# Patient Record
Sex: MALE | Race: OTHER | HISPANIC OR LATINO | Employment: FULL TIME | URBAN - METROPOLITAN AREA
[De-identification: names, ages, dates, MRNs, and addresses within clinical notes are randomized per-mention and may not be internally consistent; named-entity substitution may affect disease eponyms.]

---

## 2020-06-14 ENCOUNTER — HOSPITAL ENCOUNTER (EMERGENCY)
Facility: HOSPITAL | Age: 55
Discharge: HOME | End: 2020-06-14
Attending: EMERGENCY MEDICINE | Admitting: EMERGENCY MEDICINE
Payer: COMMERCIAL

## 2020-06-14 VITALS
OXYGEN SATURATION: 96 % | DIASTOLIC BLOOD PRESSURE: 66 MMHG | TEMPERATURE: 97.7 F | WEIGHT: 283.95 LBS | SYSTOLIC BLOOD PRESSURE: 141 MMHG | RESPIRATION RATE: 18 BRPM | HEART RATE: 80 BPM

## 2020-06-14 DIAGNOSIS — R31.9 HEMATURIA: Primary | ICD-10-CM

## 2020-06-14 LAB
BACTERIA UR QL AUTO: ABNORMAL /HPF
BILIRUB UR QL STRIP: NEGATIVE
CLARITY UR: CLEAR
CLARITY, POC: CLEAR
COLOR UR: YELLOW
COLOR, POC: YELLOW
GLUCOSE UR STRIP-MCNC: NEGATIVE MG/DL
HGB UR QL STRIP.AUTO: NEGATIVE
KETONES UR STRIP-MCNC: NEGATIVE MG/DL
LEUKOCYTE ESTERASE UR QL STRIP: NEGATIVE
NITRITE UR QL STRIP: NEGATIVE
NON-SQ EPI CELLS URNS QL MICRO: ABNORMAL /HPF
PH UR STRIP.AUTO: 5.5 [PH] (ref 4.5–8)
PROT UR STRIP-MCNC: ABNORMAL MG/DL
RBC #/AREA URNS AUTO: ABNORMAL /HPF
SP GR UR STRIP.AUTO: 1.02 (ref 1–1.03)
UROBILINOGEN UR QL STRIP.AUTO: 0.2 E.U./DL
WBC #/AREA URNS AUTO: ABNORMAL /HPF

## 2020-06-14 PROCEDURE — 99283 EMERGENCY DEPT VISIT LOW MDM: CPT

## 2020-06-14 PROCEDURE — 99284 EMERGENCY DEPT VISIT MOD MDM: CPT | Performed by: EMERGENCY MEDICINE

## 2020-06-14 PROCEDURE — 81001 URINALYSIS AUTO W/SCOPE: CPT

## 2020-06-14 RX ORDER — HYDROCHLOROTHIAZIDE 12.5 MG/1
12.5 TABLET ORAL DAILY
COMMUNITY
End: 2022-05-09

## 2020-06-14 RX ORDER — LOSARTAN POTASSIUM 100 MG/1
100 TABLET ORAL DAILY
Status: ON HOLD | COMMUNITY

## 2020-06-14 RX ORDER — AMLODIPINE BESYLATE 10 MG/1
10 TABLET ORAL DAILY
Status: ON HOLD | COMMUNITY

## 2021-08-19 ENCOUNTER — TELEPHONE (OUTPATIENT)
Dept: NEPHROLOGY | Facility: CLINIC | Age: 56
End: 2021-08-19

## 2021-08-19 NOTE — TELEPHONE ENCOUNTER
Call and spoke with patient  Set him up for next available Neph Consult time (12/15/21 with Dr Altagracia Murdock)    No preferences on the wait list

## 2021-12-14 ENCOUNTER — TELEPHONE (OUTPATIENT)
Dept: NEPHROLOGY | Facility: CLINIC | Age: 56
End: 2021-12-14

## 2021-12-15 ENCOUNTER — TELEPHONE (OUTPATIENT)
Dept: NEPHROLOGY | Facility: CLINIC | Age: 56
End: 2021-12-15

## 2022-02-28 ENCOUNTER — OFFICE VISIT (OUTPATIENT)
Dept: UROLOGY | Facility: CLINIC | Age: 57
End: 2022-02-28
Payer: COMMERCIAL

## 2022-02-28 ENCOUNTER — TELEPHONE (OUTPATIENT)
Dept: UROLOGY | Facility: CLINIC | Age: 57
End: 2022-02-28

## 2022-02-28 VITALS
BODY MASS INDEX: 41.07 KG/M2 | OXYGEN SATURATION: 96 % | HEIGHT: 68 IN | SYSTOLIC BLOOD PRESSURE: 122 MMHG | WEIGHT: 271 LBS | DIASTOLIC BLOOD PRESSURE: 76 MMHG | HEART RATE: 76 BPM

## 2022-02-28 DIAGNOSIS — R31.0 GROSS HEMATURIA: ICD-10-CM

## 2022-02-28 DIAGNOSIS — R97.20 ELEVATED PSA: Primary | ICD-10-CM

## 2022-02-28 DIAGNOSIS — E11.65 UNCONTROLLED TYPE 2 DIABETES MELLITUS WITH HYPERGLYCEMIA (HCC): ICD-10-CM

## 2022-02-28 LAB
SL AMB  POCT GLUCOSE, UA: 1000
SL AMB LEUKOCYTE ESTERASE,UA: NORMAL
SL AMB POCT BILIRUBIN,UA: NORMAL
SL AMB POCT BLOOD,UA: NORMAL
SL AMB POCT CLARITY,UA: CLEAR
SL AMB POCT COLOR,UA: YELLOW
SL AMB POCT KETONES,UA: NORMAL
SL AMB POCT NITRITE,UA: NORMAL
SL AMB POCT PH,UA: 6
SL AMB POCT SPECIFIC GRAVITY,UA: 1.02
SL AMB POCT URINE PROTEIN: NORMAL
SL AMB POCT UROBILINOGEN: 0.2

## 2022-02-28 PROCEDURE — 88112 CYTOPATH CELL ENHANCE TECH: CPT | Performed by: SPECIALIST

## 2022-02-28 PROCEDURE — 81002 URINALYSIS NONAUTO W/O SCOPE: CPT | Performed by: PHYSICIAN ASSISTANT

## 2022-02-28 PROCEDURE — 99204 OFFICE O/P NEW MOD 45 MIN: CPT | Performed by: PHYSICIAN ASSISTANT

## 2022-02-28 RX ORDER — BLOOD SUGAR DIAGNOSTIC
STRIP MISCELLANEOUS
Status: ON HOLD | COMMUNITY
Start: 2022-01-25

## 2022-02-28 RX ORDER — ORAL SEMAGLUTIDE 7 MG/1
TABLET ORAL DAILY
Status: ON HOLD | COMMUNITY
Start: 2022-01-24

## 2022-02-28 RX ORDER — LANCETS 33 GAUGE
EACH MISCELLANEOUS
Status: ON HOLD | COMMUNITY
Start: 2022-01-25

## 2022-02-28 RX ORDER — FLUTICASONE PROPIONATE 50 MCG
SPRAY, SUSPENSION (ML) NASAL AS NEEDED
Status: ON HOLD | COMMUNITY
Start: 2022-02-12

## 2022-02-28 RX ORDER — LIDOCAINE 50 MG/G
PATCH TOPICAL AS NEEDED
Status: ON HOLD | COMMUNITY
Start: 2022-02-14

## 2022-02-28 RX ORDER — DAPAGLIFLOZIN 5 MG/1
TABLET, FILM COATED ORAL DAILY
Status: ON HOLD | COMMUNITY
Start: 2022-01-24

## 2022-02-28 RX ORDER — COLCHICINE 0.6 MG/1
CAPSULE ORAL DAILY
Status: ON HOLD | COMMUNITY
Start: 2022-02-12

## 2022-02-28 NOTE — PROGRESS NOTES
2/28/2022      Chief Complaint   Patient presents with    Elevated PSA         Assessment and Plan    56 y o  male -- New patient    1  Elevated PSA  - PSA (10/19/2021) 5 24, repeat PSA (02/11/2022) was 2 24  - GAIL today deferred  - discussed PSA and fluctuations that may occur  - call with any questions or concerns in the meantime  - All questions answered; patient understands and agrees with plan    2  Bilateral intrarenal calculi  - asymptomatic at this time, however he is interested in surgical intervention    3  Gross hematuria  - discussed workup for gross hematuria including urine cytology which was sent from office today, CT scan and office cystoscopy  - patient is agreeable and will return for office cystoscopy    4  Urinary frequency  - discussed conservative measures with adequate hydration, avoidance of bladder irritants, avoidance of constipation  - discussed addition of alpha blockade, however patient would like to hold off on medication at this time      History of Present Illness  Dayanna Magallon is a 56 y o  male new patient here for evaluation of elevated PSA  Patient had PSA performed October 2021 which was 5 24  Repeat PSA 2/11/22 was 2 24  Denies family history of  malignancies  Denies urinary complaints today  Denies gross hematuria, dysuria, flank pain, suprapubic pressure, fever, chills, nausea, vomiting  Patient did have a CT scan performed an outside facility and presented documentation with results  CT scan showing prostatic calcifications without any concerning findings  Patient states that he does have bilateral intrarenal calculi that are nonobstructing  Denies any symptoms associated with this  Denies having stone passage in the past   Patient is interested in surgical intervention as he would like to prevent obstruction in the future  Patient states that he has had an episode of gross hematuria few months ago    States that this is the 1 and only episode he has ever had  Denies any precipitating event  Denies tobacco use in the past, exposures to chemicals on a daily basis  Denies family history of  malignancies  Recent CT showing no evidence of uropathy  Patient states that he is having some urinary frequency especially at night  States that his daytime symptoms are pretty well managed conservatively  Has never been on medication for bladder or prostate in the past     Denies seeing urology in the past      Review of Systems   Constitutional: Negative for activity change, appetite change, chills and fever  HENT: Negative for congestion and trouble swallowing  Respiratory: Negative for cough and shortness of breath  Cardiovascular: Negative for chest pain, palpitations and leg swelling  Gastrointestinal: Negative for abdominal pain, constipation, diarrhea, nausea and vomiting  Genitourinary: Positive for frequency (Nighttime)  Negative for difficulty urinating, dysuria, flank pain, hematuria and urgency  Musculoskeletal: Negative for back pain and gait problem  Skin: Negative for wound  Allergic/Immunologic: Negative for immunocompromised state  Neurological: Negative for dizziness and syncope  Hematological: Does not bruise/bleed easily  Psychiatric/Behavioral: Negative for confusion  All other systems reviewed and are negative  Vitals  Vitals:    02/28/22 1339   BP: 122/76   Pulse: 76   SpO2: 96%   Weight: 123 kg (271 lb)   Height: 5' 8" (1 727 m)       Physical Exam  Constitutional:       General: He is not in acute distress  Appearance: Normal appearance  He is not ill-appearing, toxic-appearing or diaphoretic  HENT:      Head: Normocephalic  Nose: No congestion  Eyes:      General: No scleral icterus  Right eye: No discharge  Left eye: No discharge  Conjunctiva/sclera: Conjunctivae normal       Pupils: Pupils are equal, round, and reactive to light     Pulmonary:      Effort: Pulmonary effort is normal    Genitourinary:     Comments: GAIL deferred  Musculoskeletal:      Cervical back: Normal range of motion  Skin:     General: Skin is warm and dry  Coloration: Skin is not jaundiced or pale  Findings: No bruising, erythema, lesion or rash  Neurological:      General: No focal deficit present  Mental Status: He is alert and oriented to person, place, and time  Mental status is at baseline  Gait: Gait normal    Psychiatric:         Mood and Affect: Mood normal          Behavior: Behavior normal          Thought Content:  Thought content normal          Judgment: Judgment normal            Past History  Past Medical History:   Diagnosis Date    Elevated PSA     Hypertension     Renal disorder      Social History     Socioeconomic History    Marital status: Single     Spouse name: None    Number of children: None    Years of education: None    Highest education level: None   Occupational History    None   Tobacco Use    Smoking status: Never Smoker    Smokeless tobacco: Never Used   Vaping Use    Vaping Use: Never used   Substance and Sexual Activity    Alcohol use: Not Currently    Drug use: Not Currently    Sexual activity: None   Other Topics Concern    None   Social History Narrative    None     Social Determinants of Health     Financial Resource Strain: Not on file   Food Insecurity: Not on file   Transportation Needs: Not on file   Physical Activity: Not on file   Stress: Not on file   Social Connections: Not on file   Intimate Partner Violence: Not on file   Housing Stability: Not on file     Social History     Tobacco Use   Smoking Status Never Smoker   Smokeless Tobacco Never Used     Family History   Problem Relation Age of Onset    No Known Problems Father     Gallbladder disease Mother     Colon cancer Maternal Grandmother     Thyroid disease Sister     Asthma Sister     Gallbladder disease Sister         recent removal of gallbladder    No Known Problems Son        The following portions of the patient's history were reviewed and updated as appropriate: allergies, current medications, past medical history, past social history, past surgical history and problem list     Results  Recent Results (from the past 1 hour(s))   POCT urine dip    Collection Time: 02/28/22  1:48 PM   Result Value Ref Range    LEUKOCYTE ESTERASE,UA -     NITRITE,UA -     SL AMB POCT UROBILINOGEN 0 2     POCT URINE PROTEIN ++      PH,UA 6 0     BLOOD,UA -     SPECIFIC GRAVITY,UA 1 025     KETONES,UA -     BILIRUBIN,UA -     GLUCOSE, UA 1,000      COLOR,UA yellow     CLARITY,UA clear    ]  No results found for: PSA  No results found for: GLUCOSE, CALCIUM, NA, K, CO2, CL, BUN, CREATININE  No results found for: WBC, HGB, HCT, MCV, PLT    Alanna Tran PA-C

## 2022-02-28 NOTE — TELEPHONE ENCOUNTER
Called patient to verify appointment address location, and asked if patient would like to come in for 1:30 pm, he said he could do that

## 2022-03-07 ENCOUNTER — HOSPITAL ENCOUNTER (OUTPATIENT)
Dept: CT IMAGING | Facility: HOSPITAL | Age: 57
Discharge: HOME | End: 2022-03-07
Payer: COMMERCIAL

## 2022-03-07 DIAGNOSIS — R31.0 GROSS HEMATURIA: ICD-10-CM

## 2022-03-07 PROCEDURE — G1004 CDSM NDSC: HCPCS

## 2022-03-07 PROCEDURE — 74178 CT ABD&PLV WO CNTR FLWD CNTR: CPT

## 2022-03-07 RX ADMIN — IOHEXOL 100 ML: 350 INJECTION, SOLUTION INTRAVENOUS at 13:35

## 2022-05-07 NOTE — PROGRESS NOTES
Problem List Items Addressed This Visit        Endocrine    Uncontrolled type 2 diabetes mellitus with hyperglycemia ([facility] [address] )       Genitourinary    Renal cyst    Relevant Orders    Cytology, urine    Nephrolithiasis    Relevant Orders    Case request operating room: CYSTOSCOPY URETEROSCOPY WITH LITHOTRIPSY HOLMIUM LASER, RETROGRADE PYELOGRAM AND INSERTION STENT URETERAL (Completed)    Cytology, urine    Gross hematuria       Other    Elevated PSA    Abnormal urine cytology - Primary    Relevant Orders    Cytology, urine            Discussion:    Epi did well with his cystoscopy today for hematuria, his hematuria is likely due to his nonobstructing stone burden  His urethra looks normal, he has no lesions within the bladder  A repeat cytology has been sent  His abnormal cytology could be due to his use of for see go with associated inflammation or infection or due to the nonobstructing stones  He did have an elevated PSA which normalized  This will be followed  He has some renal cyst on the right-hand side which are benign, Bosniak 1, these do not require long-term surveillance  He is interested in stone removal as he has passed stones previously, we talked about ureteroscopy with laser lithotripsy and all indicated procedures and the pre, bassam, postop care for this procedure  He does understand the risks of surgery which include infection, bleeding, pain, damage to surrounding structures, need for additional procedures, risk of failure of the procedure, risk of anesthesia, risk of positioning complications including neurapraxia, chronic pain, and paralysis as well as risk of rhabdomyolysis and compartment syndrome  Risk of deep venous thrombosis and venous thromboembolism as well as pneumonia and other potential unpredictable complications were also discussed with the patient      He will return for surgery          Assessment and plan:     Please see problem oriented charting for the assessment plan of today's urological complaints      Melly Trinidad MD      Chief Complaint     As above      History of Present Illness     Quinn Chirinos is a 56 y o  man with elevated PSA as well as gross hematuria  He has completed his hematuria workup today which shows some prostatic enlargement and some nonobstructing stones on his upper tract imaging, no malignant findings are noted  I did offer him a trial of alpha blockade given some occasional sensations of incomplete bladder emptying, he does not feel that his bothers bad enough to warrant this  This is reasonable  He will return for ureteroscopic stone intervention  The following portions of the patient's history were reviewed and updated as appropriate: allergies, current medications, past family history, past medical history, past social history, past surgical history and problem list     Detailed Urologic History     - please refer to HPI    Review of Systems     Review of Systems   Constitutional: Negative  HENT: Negative  Eyes: Negative  Respiratory: Negative  Cardiovascular: Negative  Gastrointestinal: Negative  Endocrine: Negative  Genitourinary: Positive for hematuria  Mild LUTS   Musculoskeletal: Positive for arthralgias, back pain and gait problem  Skin: Negative  Allergic/Immunologic: Negative  Hematological: Negative  Psychiatric/Behavioral: Negative  Allergies     No Known Allergies    Physical Exam     Physical Exam  Vitals reviewed  Constitutional:       General: He is not in acute distress  Appearance: Normal appearance  He is obese  He is not ill-appearing, toxic-appearing or diaphoretic  HENT:      Head: Normocephalic and atraumatic  Eyes:      General: No scleral icterus  Right eye: No discharge  Left eye: No discharge  Cardiovascular:      Pulses: Normal pulses     Pulmonary:      Effort: Pulmonary effort is normal    Abdominal:      General: There is no distension  Palpations: There is no mass  Genitourinary:     Penis: Normal     Musculoskeletal:         General: No swelling  Skin:     General: Skin is warm  Neurological:      General: No focal deficit present  Mental Status: He is alert and oriented to person, place, and time  Cranial Nerves: No cranial nerve deficit  Psychiatric:         Mood and Affect: Mood normal          Behavior: Behavior normal          Thought Content:  Thought content normal          Judgment: Judgment normal              Vital Signs  Vitals:    05/09/22 1324   BP: 128/74   Pulse: 72   Resp: 18   SpO2: 98%   Weight: 123 kg (271 lb)   Height: 5' 8" (1 727 m)         Current Medications       Current Outpatient Medications:     amLODIPine (NORVASC) 10 mg tablet, Take 10 mg by mouth daily, Disp: , Rfl:     Colchicine 0 6 MG CAPS, daily, Disp: , Rfl:     Farxiga 5 MG TABS, daily, Disp: , Rfl:     fluticasone (FLONASE) 50 mcg/act nasal spray, as needed, Disp: , Rfl:     Lancets (OneTouch Delica Plus [id number]) MISC, , Disp: , Rfl:     lidocaine (LIDODERM) 5 %, as needed, Disp: , Rfl:     losartan (COZAAR) 100 MG tablet, Take 100 mg by mouth daily, Disp: , Rfl:     OneTouch Verio test strip, , Disp: , Rfl:     Rybelsus 7 MG TABS, daily, Disp: , Rfl:       Active Problems     Patient Active Problem List   Diagnosis    Uncontrolled type 2 diabetes mellitus with hyperglycemia (HCC)    Elevated PSA    Renal cyst    Nephrolithiasis    Abnormal urine cytology    Gross hematuria         Past Medical History     Past Medical History:   Diagnosis Date    Elevated PSA     Hypertension     Renal disorder          Surgical History     Past Surgical History:   Procedure Laterality Date    HIP SURGERY Left     osteonecrosis    KNEE CARTILAGE SURGERY Left     ROTATOR CUFF REPAIR Left          Family History     Family History   Problem Relation Age of Onset    No Known Problems Father     Gallbladder disease Mother     Colon cancer Maternal Grandmother     Thyroid disease Sister     Asthma Sister     Gallbladder disease Sister         recent removal of gallbladder    No Known Problems Son          Social History     Social History     Social History     Tobacco Use   Smoking Status Never Smoker   Smokeless Tobacco Never Used         Pertinent Lab Values     No results found for: CREATININE    PSA 2 24 from 2/11/22 (was 5 2 in 10/2021)    Final Diagnosis   A  Urine, Clean Catch:  Atypical urothelial cells (AUC)- see comment  Few groups of atypical urothelial cells with high nuclear to cytoplasmic ratio, hyperchromasia and irregular nuclear membranes in a background of benign urothelial cells  Benign squamous cells  Scattered neutrophils  Few red blood cells      Satisfactory for evaluation             Pertinent Imaging      Imaging reviewed, simple right-sided renal cysts are noted, lower pole nonobstructing stones are noted on the left

## 2022-05-07 NOTE — PATIENT INSTRUCTIONS

## 2022-05-09 ENCOUNTER — PROCEDURE VISIT (OUTPATIENT)
Dept: UROLOGY | Facility: CLINIC | Age: 57
End: 2022-05-09
Payer: COMMERCIAL

## 2022-05-09 VITALS
OXYGEN SATURATION: 98 % | BODY MASS INDEX: 41.07 KG/M2 | SYSTOLIC BLOOD PRESSURE: 128 MMHG | DIASTOLIC BLOOD PRESSURE: 74 MMHG | HEART RATE: 72 BPM | RESPIRATION RATE: 18 BRPM | HEIGHT: 68 IN | WEIGHT: 271 LBS

## 2022-05-09 DIAGNOSIS — N20.0 NEPHROLITHIASIS: ICD-10-CM

## 2022-05-09 DIAGNOSIS — E11.65 UNCONTROLLED TYPE 2 DIABETES MELLITUS WITH HYPERGLYCEMIA (HCC): ICD-10-CM

## 2022-05-09 DIAGNOSIS — R82.89 ABNORMAL URINE CYTOLOGY: Primary | ICD-10-CM

## 2022-05-09 DIAGNOSIS — R31.0 GROSS HEMATURIA: ICD-10-CM

## 2022-05-09 DIAGNOSIS — R97.20 ELEVATED PSA: ICD-10-CM

## 2022-05-09 DIAGNOSIS — N28.1 RENAL CYST: ICD-10-CM

## 2022-05-09 PROCEDURE — 88112 CYTOPATH CELL ENHANCE TECH: CPT | Performed by: PATHOLOGY

## 2022-05-09 PROCEDURE — 52000 CYSTOURETHROSCOPY: CPT | Performed by: UROLOGY

## 2022-05-09 PROCEDURE — 99214 OFFICE O/P EST MOD 30 MIN: CPT | Performed by: UROLOGY

## 2022-05-09 RX ORDER — GABAPENTIN 100 MG/1
300 CAPSULE ORAL ONCE
OUTPATIENT
Start: 2022-05-09 | End: 2022-05-09

## 2022-05-09 RX ORDER — ACETAMINOPHEN 325 MG/1
975 TABLET ORAL ONCE
OUTPATIENT
Start: 2022-05-09 | End: 2022-05-09

## 2022-05-09 NOTE — PROGRESS NOTES
Office Cystoscopy Procedure Note    Indication:    Hematuria    Informed consent   The risks, benefits, complications, treatment options, and expected outcomes were discussed with the patient  The patient concurred with the proposed plan and provided informed consent  Anesthesia  Lidocaine jelly 2%    Antibiotic prophylaxis   None    Procedure  The patient was placed in the supineposition, was prepped and draped in the usual manner using sterile technique, and 2% lidocaine jelly instilled into the urethra  A 17 F flexible cystoscope was then inserted into the urethra and the urethra and bladder carefully examined  The following findings were noted:    Findings:  Urethra:  Normal  Prostate:  Lateral lobe hypertrophy noted  Bladder:  No lesions tumors defects or stones, no carcinoma in-situ, negative  Ureteral orifices:  Orthotopic  Other findings:  None, retroflexed view confirms    Specimens: None                 Complications:    None; patient tolerated the procedure well           Disposition: To home     Condition: Stable    Plan: Negative cystoscopy, does show some enlargement of the prostate with some enlarged varices, however       Cystoscopy     Date/Time 5/9/2022 1:57 PM     Performed by  Melly Trinidad MD     Authorized by Melly Trinidad MD      Universal Protocol:  Consent: Verbal consent obtained  Written consent obtained  Risks and benefits: risks, benefits and alternatives were discussed  Consent given by: patient  Patient understanding: patient states understanding of the procedure being performed  Patient consent: the patient's understanding of the procedure matches consent given  Procedure consent: procedure consent matches procedure scheduled  Relevant documents: relevant documents present and verified  Test results: test results available and properly labeled  Site marked: the operative site was not marked  Radiology Images displayed and confirmed   If images not available, report reviewed: imaging studies available  Required items: required blood products, implants, devices, and special equipment available  Patient identity confirmed: verbally with patient and provided demographic data        Procedure Details:  Procedure type: cystoscopy    Patient tolerance: Patient tolerated the procedure well with no immediate complications    Additional Procedure Details: Office Cystoscopy Procedure Note    Indication:    Hematuria    Informed consent   The risks, benefits, complications, treatment options, and expected outcomes were discussed with the patient  The patient concurred with the proposed plan and provided informed consent  Anesthesia  Lidocaine jelly 2%    Antibiotic prophylaxis   None    Procedure  The patient was placed in the supineposition, was prepped and draped in the usual manner using sterile technique, and 2% lidocaine jelly instilled into the urethra  A 17 F flexible cystoscope was then inserted into the urethra and the urethra and bladder carefully examined    The following findings were noted:    Findings:  Urethra:  Normal  Prostate:  Lateral lobe hypertrophy noted  Bladder:  No lesions tumors defects or stones, no carcinoma in-situ, negative  Ureteral orifices:  Orthotopic  Other findings:  None, retroflexed view confirms    Specimens: None                 Complications:    None; patient tolerated the procedure well           Disposition: To home     Condition: Stable    Plan: Negative cystoscopy, does show some enlargement of the prostate with some enlarged varices, however

## 2022-05-09 NOTE — LETTER
May 9, 2022     Licha Vegas MD  [address] [facility] [address] [address]    Patient: Annabelle Ricks   YOB: 1965   Date of Visit: 5/9/2022       Dear Dr Nair Locus:    Thank you for referring Annabelle Ricks to me for evaluation  Below are my notes for this consultation  If you have questions, please do not hesitate to call me  I look forward to following your patient along with you  Sincerely,        Brennan Whalen MD        CC: No Recipients  Brennan Whalen MD  5/9/2022  1:58 PM  Sign when Signing Visit  Office Cystoscopy Procedure Note    Indication:    Hematuria    Informed consent   The risks, benefits, complications, treatment options, and expected outcomes were discussed with the patient  The patient concurred with the proposed plan and provided informed consent  Anesthesia  Lidocaine jelly 2%    Antibiotic prophylaxis   None    Procedure  The patient was placed in the supineposition, was prepped and draped in the usual manner using sterile technique, and 2% lidocaine jelly instilled into the urethra  A 17 F flexible cystoscope was then inserted into the urethra and the urethra and bladder carefully examined  The following findings were noted:    Findings:  Urethra:  Normal  Prostate:  Lateral lobe hypertrophy noted  Bladder:  No lesions tumors defects or stones, no carcinoma in-situ, negative  Ureteral orifices:  Orthotopic  Other findings:  None, retroflexed view confirms    Specimens: None                 Complications:    None; patient tolerated the procedure well           Disposition: To home     Condition: Stable    Plan: Negative cystoscopy, does show some enlargement of the prostate with some enlarged varices, however       Cystoscopy     Date/Time 5/9/2022 1:57 PM     Performed by  Brennan Whalen MD     Authorized by Brennan Whalen MD      Universal Protocol:  Consent: Verbal consent obtained  Written consent obtained    Risks and benefits: risks, benefits and alternatives were discussed  Consent given by: patient  Patient understanding: patient states understanding of the procedure being performed  Patient consent: the patient's understanding of the procedure matches consent given  Procedure consent: procedure consent matches procedure scheduled  Relevant documents: relevant documents present and verified  Test results: test results available and properly labeled  Site marked: the operative site was not marked  Radiology Images displayed and confirmed  If images not available, report reviewed: imaging studies available  Required items: required blood products, implants, devices, and special equipment available  Patient identity confirmed: verbally with patient and provided demographic data        Procedure Details:  Procedure type: cystoscopy    Patient tolerance: Patient tolerated the procedure well with no immediate complications    Additional Procedure Details: Office Cystoscopy Procedure Note    Indication:    Hematuria    Informed consent   The risks, benefits, complications, treatment options, and expected outcomes were discussed with the patient  The patient concurred with the proposed plan and provided informed consent  Anesthesia  Lidocaine jelly 2%    Antibiotic prophylaxis   None    Procedure  The patient was placed in the supineposition, was prepped and draped in the usual manner using sterile technique, and 2% lidocaine jelly instilled into the urethra  A 17 F flexible cystoscope was then inserted into the urethra and the urethra and bladder carefully examined    The following findings were noted:    Findings:  Urethra:  Normal  Prostate:  Lateral lobe hypertrophy noted  Bladder:  No lesions tumors defects or stones, no carcinoma in-situ, negative  Ureteral orifices:  Orthotopic  Other findings:  None, retroflexed view confirms    Specimens: None                 Complications:    None; patient tolerated the procedure well Disposition: To home     Condition: Stable    Plan: Negative cystoscopy, does show some enlargement of the prostate with some enlarged varices, however              Venkata Ramirez MD  5/9/2022  1:56 PM  Sign when Signing Visit       Problem List Items Addressed This Visit        Endocrine    Uncontrolled type 2 diabetes mellitus with hyperglycemia ([facility] [address] )       Genitourinary    Renal cyst    Relevant Orders    Cytology, urine    Nephrolithiasis    Relevant Orders    Case request operating room: CYSTOSCOPY URETEROSCOPY WITH LITHOTRIPSY HOLMIUM LASER, RETROGRADE PYELOGRAM AND INSERTION STENT URETERAL (Completed)    Cytology, urine    Gross hematuria       Other    Elevated PSA    Abnormal urine cytology - Primary    Relevant Orders    Cytology, urine            Discussion:    Epi did well with his cystoscopy today for hematuria, his hematuria is likely due to his nonobstructing stone burden  His urethra looks normal, he has no lesions within the bladder  A repeat cytology has been sent  His abnormal cytology could be due to his use of for see go with associated inflammation or infection or due to the nonobstructing stones  He did have an elevated PSA which normalized  This will be followed  He has some renal cyst on the right-hand side which are benign, Bosniak 1, these do not require long-term surveillance  He is interested in stone removal as he has passed stones previously, we talked about ureteroscopy with laser lithotripsy and all indicated procedures and the pre, bassam, postop care for this procedure  He does understand the risks of surgery which include infection, bleeding, pain, damage to surrounding structures, need for additional procedures, risk of failure of the procedure, risk of anesthesia, risk of positioning complications including neurapraxia, chronic pain, and paralysis as well as risk of rhabdomyolysis and compartment syndrome    Risk of deep venous thrombosis and venous thromboembolism as well as pneumonia and other potential unpredictable complications were also discussed with the patient  He will return for surgery          Assessment and plan:     Please see problem oriented charting for the assessment plan of today's urological complaints      Wendi Sue MD      Chief Complaint     As above      History of Present Illness     Max Zhu is a 56 y o  man with elevated PSA as well as gross hematuria  He has completed his hematuria workup today which shows some prostatic enlargement and some nonobstructing stones on his upper tract imaging, no malignant findings are noted  I did offer him a trial of alpha blockade given some occasional sensations of incomplete bladder emptying, he does not feel that his bothers bad enough to warrant this  This is reasonable  He will return for ureteroscopic stone intervention  The following portions of the patient's history were reviewed and updated as appropriate: allergies, current medications, past family history, past medical history, past social history, past surgical history and problem list     Detailed Urologic History     - please refer to HPI    Review of Systems     Review of Systems   Constitutional: Negative  HENT: Negative  Eyes: Negative  Respiratory: Negative  Cardiovascular: Negative  Gastrointestinal: Negative  Endocrine: Negative  Genitourinary: Positive for hematuria  Mild LUTS   Musculoskeletal: Positive for arthralgias, back pain and gait problem  Skin: Negative  Allergic/Immunologic: Negative  Hematological: Negative  Psychiatric/Behavioral: Negative  Allergies     No Known Allergies    Physical Exam     Physical Exam  Vitals reviewed  Constitutional:       General: He is not in acute distress  Appearance: Normal appearance  He is obese  He is not ill-appearing, toxic-appearing or diaphoretic  HENT:      Head: Normocephalic and atraumatic  Eyes:      General: No scleral icterus  Right eye: No discharge  Left eye: No discharge  Cardiovascular:      Pulses: Normal pulses  Pulmonary:      Effort: Pulmonary effort is normal    Abdominal:      General: There is no distension  Palpations: There is no mass  Genitourinary:     Penis: Normal     Musculoskeletal:         General: No swelling  Skin:     General: Skin is warm  Neurological:      General: No focal deficit present  Mental Status: He is alert and oriented to person, place, and time  Cranial Nerves: No cranial nerve deficit  Psychiatric:         Mood and Affect: Mood normal          Behavior: Behavior normal          Thought Content:  Thought content normal          Judgment: Judgment normal              Vital Signs  Vitals:    05/09/22 1324   BP: 128/74   Pulse: 72   Resp: 18   SpO2: 98%   Weight: 123 kg (271 lb)   Height: 5' 8" (1 727 m)         Current Medications       Current Outpatient Medications:     amLODIPine (NORVASC) 10 mg tablet, Take 10 mg by mouth daily, Disp: , Rfl:     Colchicine 0 6 MG CAPS, daily, Disp: , Rfl:     Farxiga 5 MG TABS, daily, Disp: , Rfl:     fluticasone (FLONASE) 50 mcg/act nasal spray, as needed, Disp: , Rfl:     Lancets (OneTouch Delica Plus [id number]) MISC, , Disp: , Rfl:     lidocaine (LIDODERM) 5 %, as needed, Disp: , Rfl:     losartan (COZAAR) 100 MG tablet, Take 100 mg by mouth daily, Disp: , Rfl:     OneTouch Verio test strip, , Disp: , Rfl:     Rybelsus 7 MG TABS, daily, Disp: , Rfl:       Active Problems     Patient Active Problem List   Diagnosis    Uncontrolled type 2 diabetes mellitus with hyperglycemia (HCC)    Elevated PSA    Renal cyst    Nephrolithiasis    Abnormal urine cytology    Gross hematuria         Past Medical History     Past Medical History:   Diagnosis Date    Elevated PSA     Hypertension     Renal disorder          Surgical History     Past Surgical History:   Procedure Laterality Date    HIP SURGERY Left     osteonecrosis    KNEE CARTILAGE SURGERY Left     ROTATOR CUFF REPAIR Left          Family History     Family History   Problem Relation Age of Onset    No Known Problems Father     Gallbladder disease Mother     Colon cancer Maternal Grandmother     Thyroid disease Sister     Asthma Sister     Gallbladder disease Sister         recent removal of gallbladder    No Known Problems Son          Social History     Social History     Social History     Tobacco Use   Smoking Status Never Smoker   Smokeless Tobacco Never Used         Pertinent Lab Values     No results found for: CREATININE    PSA 2 24 from 2/11/22 (was 5 2 in 10/2021)    Final Diagnosis   A  Urine, Clean Catch:  Atypical urothelial cells (AUC)- see comment  Few groups of atypical urothelial cells with high nuclear to cytoplasmic ratio, hyperchromasia and irregular nuclear membranes in a background of benign urothelial cells  Benign squamous cells  Scattered neutrophils  Few red blood cells      Satisfactory for evaluation             Pertinent Imaging      Imaging reviewed, simple right-sided renal cysts are noted, lower pole nonobstructing stones are noted on the left

## 2022-05-10 ENCOUNTER — TELEPHONE (OUTPATIENT)
Dept: UROLOGY | Facility: CLINIC | Age: 57
End: 2022-05-10

## 2022-05-10 NOTE — TELEPHONE ENCOUNTER
Spoke w patient and since he resides in [facility] and prefers Dr Alexus Nowak for procedure we have sched him for 7/15 at the [facility]  He is aware he needs a  and hospital will contact him day prior w time of arrival  He will go for PATs on 6/30 and advised 7 day hold of any aspirin products, multivitamins and fish oils  He is aware I will mail his packet in June and to contact us if he should need anything

## 2022-06-07 ENCOUNTER — PREP FOR PROCEDURE (OUTPATIENT)
Dept: UROLOGY | Facility: CLINIC | Age: 57
End: 2022-06-07

## 2022-06-07 DIAGNOSIS — R82.89 ABNORMAL URINE CYTOLOGY: Primary | ICD-10-CM

## 2022-06-07 NOTE — TELEPHONE ENCOUNTER
MICHAEL for patient informing him that I am mailing his surgical packet and if he should have any questions to pls contact us

## 2022-06-16 ENCOUNTER — TELEPHONE (OUTPATIENT)
Dept: UROLOGY | Facility: MEDICAL CENTER | Age: 57
End: 2022-06-16

## 2022-07-08 NOTE — TELEPHONE ENCOUNTER
Patient called wanting to speak to   Patient wants to reschedule his procedure on 07/15/22  He is not able to do it at this time  Spoke with Haseeb via teams  Next available 09/16/22  Patient stated he is not available for that date  He would like a call back          Patient can be reached at [phone]

## 2022-07-08 NOTE — TELEPHONE ENCOUNTER
Spoke w patient and he is sched for next available at the [facility] [address] Amilcar aGona on 9/16  He is aware to now get his PATs done on 9/1 for procedure  He will call us if this date does not work or if he should need anything

## 2022-08-05 ENCOUNTER — NURSE TRIAGE (OUTPATIENT)
Dept: OTHER | Facility: OTHER | Age: 57
End: 2022-08-05

## 2022-08-05 DIAGNOSIS — R31.0 GROSS HEMATURIA: ICD-10-CM

## 2022-08-05 DIAGNOSIS — R10.9 ACUTE FLANK PAIN: Primary | ICD-10-CM

## 2022-08-05 NOTE — TELEPHONE ENCOUNTER
Relayed recs to patient  Patient in agreement to all testing  Given central scheduling's number and he will call right away  Also will go in morning for urine testing   Advised patient if pain worsens or he develops temp to go to ER and patient verbalized understanding     Office monitor for results

## 2022-08-05 NOTE — TELEPHONE ENCOUNTER
Reason for Disposition   Side (flank) or back pain present    Answer Assessment - Initial Assessment Questions  1  COLOR of URINE: "Describe the color of the urine "  (e g , tea-colored, pink, red, blood clots, bloody)      Dark yellow, bright red in color yesterday     2  ONSET: "When did the bleeding start?"       Yesterday     3  EPISODES: "How many times has there been blood in the urine?" or "How many times today?"      Throughout the day yesterday     4  PAIN with URINATION: "Is there any pain with passing your urine?" If Yes, ask: "How bad is the pain?"  (Scale 1-10; or mild, moderate, severe)     - MILD - complains slightly about urination hurting     - MODERATE - interferes with normal activities       - SEVERE - excruciating, unwilling or unable to urinate because of the pain       Yes some slight burning at the end of stream     5  FEVER: "Do you have a fever?" If Yes, ask: "What is your temperature, how was it measured, and when did it start?"      Yesterday at office 100 8 temperature, feels better today     6  ASSOCIATED SYMPTOMS: "Are you passing urine more frequently than usual?"      Flank pain yesterday on left side and today on right side     7   OTHER SYMPTOMS: "Do you have any other symptoms?" (e g , back/flank pain, abdominal pain, vomiting)      Diarrhea yesterday and some abdominal pain    Protocols used: URINE - BLOOD IN-ADULT-OH

## 2022-08-05 NOTE — TELEPHONE ENCOUNTER
Returned call to patient   Patient states that he did have temp yesterday 100 0 did not recheck temp to day , but is feeling better than yesterday  Reports back pain right side   Left side intermittent pain three out of ten  Urine color is reported as yellow to dark yellow  Took tylenol last dose was 1:45pm  + nausea , Took Pepto bismol which has helped  Loose stools reported yesterday  Today has not gone , but feeling little  + coughing slight and slight headache  Patient saw pcp in [place] who thought maybe related to viral gastritis  They do do covid test which is pending  Patient started with small amount of blood in urine which has now cleared   Straining urine no stone obtained, some " gunk reported"

## 2022-08-05 NOTE — TELEPHONE ENCOUNTER
I placed order for stat CT to rule out obstructing stone  Additionally I placed orders for urine testing  If any severe pain or fever he should go to the ER

## 2022-08-05 NOTE — TELEPHONE ENCOUNTER
Regarding: Blood in Urine, Pain, Chills  ----- Message from Mera Prieto sent at 8/5/2022 12:04 PM EDT -----  " I Started having Blood in my Urine and Pain in my back all of the time starting yesterday  I am in a lot of pain  I have Chills   I have 2 Kidney Stones on each side "

## 2022-08-05 NOTE — TELEPHONE ENCOUNTER
Patient calling in stating that he began with flank pain yesterday and some episodes of bright red blood in his urine  He states that he did go to his PCP who assessed him and told him to follow up with urology  PCP did not test his urine or prescribe any new medications  The patient states that today his urine is dark yellow and he is having pain on his other flank area  He states that he did have a temperature of 100 8 yesterday in the office as well as some diarrhea but he has not checked his temperature yet today  Patient denies any other urinary symptoms at this time

## 2022-08-06 ENCOUNTER — APPOINTMENT (OUTPATIENT)
Dept: LAB | Facility: HOSPITAL | Age: 57
End: 2022-08-06
Payer: COMMERCIAL

## 2022-08-06 ENCOUNTER — HOSPITAL ENCOUNTER (OUTPATIENT)
Dept: CT IMAGING | Facility: HOSPITAL | Age: 57
Discharge: HOME | End: 2022-08-06
Payer: COMMERCIAL

## 2022-08-06 ENCOUNTER — NURSE TRIAGE (OUTPATIENT)
Dept: OTHER | Facility: OTHER | Age: 57
End: 2022-08-06

## 2022-08-06 DIAGNOSIS — R31.0 GROSS HEMATURIA: ICD-10-CM

## 2022-08-06 DIAGNOSIS — R10.9 ACUTE FLANK PAIN: ICD-10-CM

## 2022-08-06 LAB
BACTERIA UR QL AUTO: ABNORMAL /HPF
BILIRUB UR QL STRIP: NEGATIVE
CLARITY UR: ABNORMAL
COLOR UR: YELLOW
GLUCOSE UR STRIP-MCNC: ABNORMAL MG/DL
HGB UR QL STRIP.AUTO: 250
KETONES UR STRIP-MCNC: ABNORMAL MG/DL
LEUKOCYTE ESTERASE UR QL STRIP: 500
NITRITE UR QL STRIP: NEGATIVE
NON-SQ EPI CELLS URNS QL MICRO: ABNORMAL /HPF
PH UR STRIP.AUTO: 6 [PH]
PROT UR STRIP-MCNC: >=500 MG/DL
RBC #/AREA URNS AUTO: ABNORMAL /HPF
SP GR UR STRIP.AUTO: 1.01 (ref 1–1.04)
UROBILINOGEN UA: 1 MG/DL
WBC #/AREA URNS AUTO: ABNORMAL /HPF

## 2022-08-06 PROCEDURE — G1004 CDSM NDSC: HCPCS

## 2022-08-06 PROCEDURE — 81001 URINALYSIS AUTO W/SCOPE: CPT | Performed by: PHYSICIAN ASSISTANT

## 2022-08-06 PROCEDURE — 87077 CULTURE AEROBIC IDENTIFY: CPT

## 2022-08-06 PROCEDURE — 74176 CT ABD & PELVIS W/O CONTRAST: CPT

## 2022-08-06 PROCEDURE — 87086 URINE CULTURE/COLONY COUNT: CPT

## 2022-08-06 PROCEDURE — 87186 SC STD MICRODIL/AGAR DIL: CPT

## 2022-08-06 NOTE — TELEPHONE ENCOUNTER
Reason for Disposition   [1] SEVERE pain (e g , excruciating, scale 8-10) AND [2] not improved after pain medicine    Answer Assessment - Initial Assessment Questions  1  LOCATION: "Where does it hurt?" (e g , left, right)      Lower abd,   2  ONSET: "When did the pain start?"      yesterday  3  SEVERITY: "How bad is the pain?" (e g , Scale 1-10; mild, moderate, or severe)    - MILD (1-3): doesn't interfere with normal activities     - MODERATE (4-7): interferes with normal activities or awakens from sleep     - SEVERE (8-10): excruciating pain and patient unable to do normal activities (stays in bed)        severe  4  PATTERN: "Does the pain come and go, or is it constant?"       constant  5  CAUSE: "What do you think is causing the pain?"      Kidney stones   6  OTHER SYMPTOMS:  "Do you have any other symptoms?" (e g , fever, abdominal pain, vomiting, leg weakness, burning with urination, blood in urine)      Chills, diarrhea, sob    Protocols used:  FLANK PAIN-ADULT-

## 2022-08-06 NOTE — TELEPHONE ENCOUNTER
Regarding: Kidney Stones, Pain  ----- Message from Libby Morris sent at 8/6/2022  4:57 PM EDT -----  " I had a CT Scan today and Urine Lab Work for Kidney Stones  I need to know if I should go into the Hospital, I am in a lot of pain   I need advise "

## 2022-08-08 ENCOUNTER — HOSPITAL ENCOUNTER (INPATIENT)
Facility: HOSPITAL | Age: 57
LOS: 3 days | Discharge: HOME | DRG: 854 | End: 2022-08-11
Attending: EMERGENCY MEDICINE | Admitting: STUDENT IN AN ORGANIZED HEALTH CARE EDUCATION/TRAINING PROGRAM
Payer: COMMERCIAL

## 2022-08-08 ENCOUNTER — PREP FOR PROCEDURE (OUTPATIENT)
Dept: OTHER | Facility: HOSPITAL | Age: 57
End: 2022-08-08

## 2022-08-08 ENCOUNTER — ANESTHESIA EVENT (EMERGENCY)
Dept: PERIOP | Facility: HOSPITAL | Age: 57
DRG: 854 | End: 2022-08-08
Payer: COMMERCIAL

## 2022-08-08 ENCOUNTER — ANESTHESIA (EMERGENCY)
Dept: PERIOP | Facility: HOSPITAL | Age: 57
DRG: 854 | End: 2022-08-08
Payer: COMMERCIAL

## 2022-08-08 ENCOUNTER — APPOINTMENT (EMERGENCY)
Dept: RADIOLOGY | Facility: HOSPITAL | Age: 57
DRG: 854 | End: 2022-08-08
Payer: COMMERCIAL

## 2022-08-08 DIAGNOSIS — N20.1 URETERAL CALCULUS: ICD-10-CM

## 2022-08-08 DIAGNOSIS — N20.1 LEFT URETERAL STONE: Primary | ICD-10-CM

## 2022-08-08 DIAGNOSIS — N39.0 UTI (URINARY TRACT INFECTION): ICD-10-CM

## 2022-08-08 DIAGNOSIS — R50.9 FEVER: ICD-10-CM

## 2022-08-08 DIAGNOSIS — N39.0 URINARY TRACT INFECTION WITH HEMATURIA, SITE UNSPECIFIED: Primary | ICD-10-CM

## 2022-08-08 DIAGNOSIS — E11.65 UNCONTROLLED TYPE 2 DIABETES MELLITUS WITH HYPERGLYCEMIA (HCC): ICD-10-CM

## 2022-08-08 DIAGNOSIS — R31.9 URINARY TRACT INFECTION WITH HEMATURIA, SITE UNSPECIFIED: Primary | ICD-10-CM

## 2022-08-08 DIAGNOSIS — N20.1 URETERAL CALCULUS: Primary | ICD-10-CM

## 2022-08-08 PROBLEM — E11.69 DIABETES MELLITUS TYPE 2 IN OBESE (HCC): Status: ACTIVE | Noted: 2022-08-08

## 2022-08-08 PROBLEM — N18.31 ACUTE RENAL FAILURE SUPERIMPOSED ON STAGE 3A CHRONIC KIDNEY DISEASE (HCC): Status: ACTIVE | Noted: 2022-08-08

## 2022-08-08 PROBLEM — N11.1 OBSTRUCTIVE PYELONEPHRITIS: Status: ACTIVE | Noted: 2022-08-08

## 2022-08-08 PROBLEM — E87.1 HYPONATREMIA: Status: ACTIVE | Noted: 2022-08-08

## 2022-08-08 PROBLEM — E66.01 MORBID OBESITY (HCC): Status: ACTIVE | Noted: 2022-08-08

## 2022-08-08 PROBLEM — A41.9 SEPSIS (HCC): Status: ACTIVE | Noted: 2022-08-08

## 2022-08-08 PROBLEM — E66.9 DIABETES MELLITUS TYPE 2 IN OBESE (HCC): Status: ACTIVE | Noted: 2022-08-08

## 2022-08-08 PROBLEM — N17.9 ACUTE KIDNEY INJURY (HCC): Status: ACTIVE | Noted: 2022-08-08

## 2022-08-08 PROBLEM — I10 ESSENTIAL HYPERTENSION: Status: ACTIVE | Noted: 2022-08-08

## 2022-08-08 LAB
ALBUMIN SERPL BCP-MCNC: 2.9 G/DL (ref 3.5–5)
ALP SERPL-CCNC: 122 U/L (ref 46–116)
ALT SERPL W P-5'-P-CCNC: 35 U/L (ref 12–78)
ANION GAP SERPL CALCULATED.3IONS-SCNC: 7 MMOL/L (ref 4–13)
APTT PPP: 32 SECONDS (ref 23–37)
AST SERPL W P-5'-P-CCNC: 27 U/L (ref 5–45)
BACTERIA UR CULT: ABNORMAL
BACTERIA UR QL AUTO: ABNORMAL /HPF
BASOPHILS # BLD AUTO: 0.04 THOUSANDS/ΜL (ref 0–0.1)
BASOPHILS NFR BLD AUTO: 0 % (ref 0–1)
BILIRUB SERPL-MCNC: 1.2 MG/DL (ref 0.2–1)
BILIRUB UR QL STRIP: NEGATIVE
BUN SERPL-MCNC: 31 MG/DL (ref 5–25)
CALCIUM ALBUM COR SERPL-MCNC: 10.4 MG/DL (ref 8.3–10.1)
CALCIUM SERPL-MCNC: 9.5 MG/DL (ref 8.3–10.1)
CHLORIDE SERPL-SCNC: 100 MMOL/L (ref 96–108)
CLARITY UR: ABNORMAL
CO2 SERPL-SCNC: 25 MMOL/L (ref 21–32)
COLOR UR: ABNORMAL
CREAT SERPL-MCNC: 2.56 MG/DL (ref 0.6–1.3)
EOSINOPHIL # BLD AUTO: 0 THOUSAND/ΜL (ref 0–0.61)
EOSINOPHIL NFR BLD AUTO: 0 % (ref 0–6)
ERYTHROCYTE [DISTWIDTH] IN BLOOD BY AUTOMATED COUNT: 12.7 % (ref 11.6–15.1)
GFR SERPL CREATININE-BSD FRML MDRD: 27 ML/MIN/1.73SQ M
GLUCOSE SERPL-MCNC: 146 MG/DL (ref 65–140)
GLUCOSE SERPL-MCNC: 152 MG/DL (ref 65–140)
GLUCOSE SERPL-MCNC: 171 MG/DL (ref 65–140)
GLUCOSE UR STRIP-MCNC: ABNORMAL MG/DL
HCT VFR BLD AUTO: 47.9 % (ref 36.5–49.3)
HGB BLD-MCNC: 15.7 G/DL (ref 12–17)
HGB UR QL STRIP.AUTO: ABNORMAL
IMM GRANULOCYTES # BLD AUTO: 0.1 THOUSAND/UL (ref 0–0.2)
IMM GRANULOCYTES NFR BLD AUTO: 1 % (ref 0–2)
INR PPP: 1.11 (ref 0.84–1.19)
KETONES UR STRIP-MCNC: NEGATIVE MG/DL
LACTATE SERPL-SCNC: 0.9 MMOL/L (ref 0.5–2)
LACTATE SERPL-SCNC: 2.6 MMOL/L (ref 0.5–2)
LEUKOCYTE ESTERASE UR QL STRIP: ABNORMAL
LIPASE SERPL-CCNC: 305 U/L (ref 73–393)
LYMPHOCYTES # BLD AUTO: 1.4 THOUSANDS/ΜL (ref 0.6–4.47)
LYMPHOCYTES NFR BLD AUTO: 9 % (ref 14–44)
MCH RBC QN AUTO: 28.9 PG (ref 26.8–34.3)
MCHC RBC AUTO-ENTMCNC: 32.8 G/DL (ref 31.4–37.4)
MCV RBC AUTO: 88 FL (ref 82–98)
MONOCYTES # BLD AUTO: 1.39 THOUSAND/ΜL (ref 0.17–1.22)
MONOCYTES NFR BLD AUTO: 9 % (ref 4–12)
NEUTROPHILS # BLD AUTO: 11.95 THOUSANDS/ΜL (ref 1.85–7.62)
NEUTS SEG NFR BLD AUTO: 81 % (ref 43–75)
NITRITE UR QL STRIP: NEGATIVE
NON-SQ EPI CELLS URNS QL MICRO: ABNORMAL /HPF
NRBC BLD AUTO-RTO: 0 /100 WBCS
PH UR STRIP.AUTO: 5.5 [PH] (ref 4.5–8)
PLATELET # BLD AUTO: 264 THOUSANDS/UL (ref 149–390)
PMV BLD AUTO: 10.4 FL (ref 8.9–12.7)
POTASSIUM SERPL-SCNC: 3.8 MMOL/L (ref 3.5–5.3)
PROCALCITONIN SERPL-MCNC: 8.3 NG/ML
PROT SERPL-MCNC: 8.8 G/DL (ref 6.4–8.4)
PROT UR STRIP-MCNC: >=300 MG/DL
PROTHROMBIN TIME: 14.5 SECONDS (ref 11.6–14.5)
RBC # BLD AUTO: 5.44 MILLION/UL (ref 3.88–5.62)
RBC #/AREA URNS AUTO: ABNORMAL /HPF
SARS-COV-2 RNA RESP QL NAA+PROBE: NEGATIVE
SODIUM SERPL-SCNC: 132 MMOL/L (ref 135–147)
SP GR UR STRIP.AUTO: 1.01 (ref 1–1.03)
UROBILINOGEN UR QL STRIP.AUTO: 0.2 E.U./DL
WBC # BLD AUTO: 14.88 THOUSAND/UL (ref 4.31–10.16)
WBC #/AREA URNS AUTO: ABNORMAL /HPF
WBC CLUMPS # UR AUTO: PRESENT /UL

## 2022-08-08 PROCEDURE — 87086 URINE CULTURE/COLONY COUNT: CPT | Performed by: STUDENT IN AN ORGANIZED HEALTH CARE EDUCATION/TRAINING PROGRAM

## 2022-08-08 PROCEDURE — 99222 1ST HOSP IP/OBS MODERATE 55: CPT | Performed by: INTERNAL MEDICINE

## 2022-08-08 PROCEDURE — C2617 STENT, NON-COR, TEM W/O DEL: HCPCS | Performed by: STUDENT IN AN ORGANIZED HEALTH CARE EDUCATION/TRAINING PROGRAM

## 2022-08-08 PROCEDURE — 0T788DZ DILATION OF BILATERAL URETERS WITH INTRALUMINAL DEVICE, VIA NATURAL OR ARTIFICIAL OPENING ENDOSCOPIC: ICD-10-PCS | Performed by: STUDENT IN AN ORGANIZED HEALTH CARE EDUCATION/TRAINING PROGRAM

## 2022-08-08 PROCEDURE — 84145 PROCALCITONIN (PCT): CPT | Performed by: EMERGENCY MEDICINE

## 2022-08-08 PROCEDURE — 99285 EMERGENCY DEPT VISIT HI MDM: CPT | Performed by: EMERGENCY MEDICINE

## 2022-08-08 PROCEDURE — 82948 REAGENT STRIP/BLOOD GLUCOSE: CPT

## 2022-08-08 PROCEDURE — 0T9B80Z DRAINAGE OF BLADDER WITH DRAINAGE DEVICE, VIA NATURAL OR ARTIFICIAL OPENING ENDOSCOPIC: ICD-10-PCS | Performed by: STUDENT IN AN ORGANIZED HEALTH CARE EDUCATION/TRAINING PROGRAM

## 2022-08-08 PROCEDURE — 87086 URINE CULTURE/COLONY COUNT: CPT

## 2022-08-08 PROCEDURE — 87186 SC STD MICRODIL/AGAR DIL: CPT | Performed by: STUDENT IN AN ORGANIZED HEALTH CARE EDUCATION/TRAINING PROGRAM

## 2022-08-08 PROCEDURE — 85025 COMPLETE CBC W/AUTO DIFF WBC: CPT | Performed by: EMERGENCY MEDICINE

## 2022-08-08 PROCEDURE — 81001 URINALYSIS AUTO W/SCOPE: CPT

## 2022-08-08 PROCEDURE — 85610 PROTHROMBIN TIME: CPT | Performed by: EMERGENCY MEDICINE

## 2022-08-08 PROCEDURE — 85730 THROMBOPLASTIN TIME PARTIAL: CPT | Performed by: EMERGENCY MEDICINE

## 2022-08-08 PROCEDURE — U0003 INFECTIOUS AGENT DETECTION BY NUCLEIC ACID (DNA OR RNA); SEVERE ACUTE RESPIRATORY SYNDROME CORONAVIRUS 2 (SARS-COV-2) (CORONAVIRUS DISEASE [COVID-19]), AMPLIFIED PROBE TECHNIQUE, MAKING USE OF HIGH THROUGHPUT TECHNOLOGIES AS DESCRIBED BY CMS-2020-01-R: HCPCS | Performed by: EMERGENCY MEDICINE

## 2022-08-08 PROCEDURE — 87077 CULTURE AEROBIC IDENTIFY: CPT

## 2022-08-08 PROCEDURE — 36415 COLL VENOUS BLD VENIPUNCTURE: CPT | Performed by: EMERGENCY MEDICINE

## 2022-08-08 PROCEDURE — 87077 CULTURE AEROBIC IDENTIFY: CPT | Performed by: STUDENT IN AN ORGANIZED HEALTH CARE EDUCATION/TRAINING PROGRAM

## 2022-08-08 PROCEDURE — C1758 CATHETER, URETERAL: HCPCS | Performed by: STUDENT IN AN ORGANIZED HEALTH CARE EDUCATION/TRAINING PROGRAM

## 2022-08-08 PROCEDURE — 99285 EMERGENCY DEPT VISIT HI MDM: CPT

## 2022-08-08 PROCEDURE — 80053 COMPREHEN METABOLIC PANEL: CPT | Performed by: EMERGENCY MEDICINE

## 2022-08-08 PROCEDURE — U0005 INFEC AGEN DETEC AMPLI PROBE: HCPCS | Performed by: EMERGENCY MEDICINE

## 2022-08-08 PROCEDURE — 83690 ASSAY OF LIPASE: CPT | Performed by: EMERGENCY MEDICINE

## 2022-08-08 PROCEDURE — 99222 1ST HOSP IP/OBS MODERATE 55: CPT | Performed by: STUDENT IN AN ORGANIZED HEALTH CARE EDUCATION/TRAINING PROGRAM

## 2022-08-08 PROCEDURE — 74420 UROGRAPHY RTRGR +-KUB: CPT

## 2022-08-08 PROCEDURE — 52332 CYSTOSCOPY AND TREATMENT: CPT | Performed by: STUDENT IN AN ORGANIZED HEALTH CARE EDUCATION/TRAINING PROGRAM

## 2022-08-08 PROCEDURE — 87154 CUL TYP ID BLD PTHGN 6+ TRGT: CPT | Performed by: EMERGENCY MEDICINE

## 2022-08-08 PROCEDURE — 83605 ASSAY OF LACTIC ACID: CPT | Performed by: EMERGENCY MEDICINE

## 2022-08-08 PROCEDURE — BT141ZZ FLUOROSCOPY OF KIDNEYS, URETERS AND BLADDER USING LOW OSMOLAR CONTRAST: ICD-10-PCS | Performed by: STUDENT IN AN ORGANIZED HEALTH CARE EDUCATION/TRAINING PROGRAM

## 2022-08-08 PROCEDURE — C1769 GUIDE WIRE: HCPCS | Performed by: STUDENT IN AN ORGANIZED HEALTH CARE EDUCATION/TRAINING PROGRAM

## 2022-08-08 PROCEDURE — 87040 BLOOD CULTURE FOR BACTERIA: CPT | Performed by: EMERGENCY MEDICINE

## 2022-08-08 PROCEDURE — 87186 SC STD MICRODIL/AGAR DIL: CPT | Performed by: EMERGENCY MEDICINE

## 2022-08-08 PROCEDURE — 87186 SC STD MICRODIL/AGAR DIL: CPT

## 2022-08-08 DEVICE — STENT URETERAL 6 FR 26CM INLAY OPTIMA
Type: IMPLANTABLE DEVICE | Site: URETER | Status: NON-FUNCTIONAL
Removed: 2022-09-16

## 2022-08-08 RX ORDER — AMLODIPINE BESYLATE 10 MG/1
10 TABLET ORAL DAILY
Status: DISCONTINUED | OUTPATIENT
Start: 2022-08-09 | End: 2022-08-11 | Stop reason: HOSPADM

## 2022-08-08 RX ORDER — SODIUM CHLORIDE, SODIUM LACTATE, POTASSIUM CHLORIDE, CALCIUM CHLORIDE 600; 310; 30; 20 MG/100ML; MG/100ML; MG/100ML; MG/100ML
125 INJECTION, SOLUTION INTRAVENOUS CONTINUOUS
Status: DISCONTINUED | OUTPATIENT
Start: 2022-08-08 | End: 2022-08-09

## 2022-08-08 RX ORDER — ONDANSETRON 2 MG/ML
4 INJECTION INTRAMUSCULAR; INTRAVENOUS EVERY 4 HOURS PRN
Status: DISCONTINUED | OUTPATIENT
Start: 2022-08-08 | End: 2022-08-11 | Stop reason: HOSPADM

## 2022-08-08 RX ORDER — TAMSULOSIN HYDROCHLORIDE 0.4 MG/1
0.4 CAPSULE ORAL
Qty: 30 CAPSULE | Refills: 0 | Status: ON HOLD | OUTPATIENT
Start: 2022-08-08 | End: 2022-08-11 | Stop reason: SDUPTHER

## 2022-08-08 RX ORDER — FENTANYL CITRATE/PF 50 MCG/ML
25 SYRINGE (ML) INJECTION
Status: COMPLETED | OUTPATIENT
Start: 2022-08-08 | End: 2022-08-08

## 2022-08-08 RX ORDER — LIDOCAINE HYDROCHLORIDE 10 MG/ML
INJECTION, SOLUTION EPIDURAL; INFILTRATION; INTRACAUDAL; PERINEURAL AS NEEDED
Status: DISCONTINUED | OUTPATIENT
Start: 2022-08-08 | End: 2022-08-08

## 2022-08-08 RX ORDER — SODIUM CHLORIDE, SODIUM LACTATE, POTASSIUM CHLORIDE, CALCIUM CHLORIDE 600; 310; 30; 20 MG/100ML; MG/100ML; MG/100ML; MG/100ML
INJECTION, SOLUTION INTRAVENOUS CONTINUOUS PRN
Status: DISCONTINUED | OUTPATIENT
Start: 2022-08-08 | End: 2022-08-08

## 2022-08-08 RX ORDER — HYDROMORPHONE HCL/PF 1 MG/ML
0.2 SYRINGE (ML) INJECTION
Status: DISCONTINUED | OUTPATIENT
Start: 2022-08-08 | End: 2022-08-11 | Stop reason: HOSPADM

## 2022-08-08 RX ORDER — EPHEDRINE SULFATE 50 MG/ML
INJECTION INTRAVENOUS AS NEEDED
Status: DISCONTINUED | OUTPATIENT
Start: 2022-08-08 | End: 2022-08-08

## 2022-08-08 RX ORDER — HYDROMORPHONE HCL IN WATER/PF 6 MG/30 ML
0.2 PATIENT CONTROLLED ANALGESIA SYRINGE INTRAVENOUS
Status: DISCONTINUED | OUTPATIENT
Start: 2022-08-08 | End: 2022-08-08 | Stop reason: HOSPADM

## 2022-08-08 RX ORDER — SODIUM CHLORIDE 9 MG/ML
125 INJECTION, SOLUTION INTRAVENOUS CONTINUOUS
Status: DISCONTINUED | OUTPATIENT
Start: 2022-08-08 | End: 2022-08-08

## 2022-08-08 RX ORDER — CALCIUM CHLORIDE 100 MG/ML
INJECTION INTRAVENOUS; INTRAVENTRICULAR AS NEEDED
Status: DISCONTINUED | OUTPATIENT
Start: 2022-08-08 | End: 2022-08-08

## 2022-08-08 RX ORDER — HEPARIN SODIUM 5000 [USP'U]/ML
5000 INJECTION, SOLUTION INTRAVENOUS; SUBCUTANEOUS EVERY 8 HOURS SCHEDULED
Status: DISCONTINUED | OUTPATIENT
Start: 2022-08-08 | End: 2022-08-11 | Stop reason: HOSPADM

## 2022-08-08 RX ORDER — DEXAMETHASONE SODIUM PHOSPHATE 10 MG/ML
INJECTION, SOLUTION INTRAMUSCULAR; INTRAVENOUS AS NEEDED
Status: DISCONTINUED | OUTPATIENT
Start: 2022-08-08 | End: 2022-08-08

## 2022-08-08 RX ORDER — OXYBUTYNIN CHLORIDE 5 MG/1
5 TABLET ORAL 3 TIMES DAILY PRN
Status: DISCONTINUED | OUTPATIENT
Start: 2022-08-08 | End: 2022-08-10

## 2022-08-08 RX ORDER — TAMSULOSIN HYDROCHLORIDE 0.4 MG/1
0.4 CAPSULE ORAL
Status: DISCONTINUED | OUTPATIENT
Start: 2022-08-09 | End: 2022-08-11 | Stop reason: HOSPADM

## 2022-08-08 RX ORDER — MIDAZOLAM HYDROCHLORIDE 2 MG/2ML
INJECTION, SOLUTION INTRAMUSCULAR; INTRAVENOUS AS NEEDED
Status: DISCONTINUED | OUTPATIENT
Start: 2022-08-08 | End: 2022-08-08

## 2022-08-08 RX ORDER — CEPHALEXIN 500 MG/1
500 CAPSULE ORAL EVERY 12 HOURS SCHEDULED
Qty: 14 CAPSULE | Refills: 0 | Status: SHIPPED | OUTPATIENT
Start: 2022-08-08 | End: 2022-08-11

## 2022-08-08 RX ORDER — ONDANSETRON 2 MG/ML
4 INJECTION INTRAMUSCULAR; INTRAVENOUS EVERY 4 HOURS PRN
Status: DISCONTINUED | OUTPATIENT
Start: 2022-08-08 | End: 2022-08-08 | Stop reason: HOSPADM

## 2022-08-08 RX ORDER — KETOROLAC TROMETHAMINE 30 MG/ML
30 INJECTION, SOLUTION INTRAMUSCULAR; INTRAVENOUS EVERY 6 HOURS PRN
Status: DISCONTINUED | OUTPATIENT
Start: 2022-08-08 | End: 2022-08-08

## 2022-08-08 RX ORDER — ONDANSETRON 2 MG/ML
INJECTION INTRAMUSCULAR; INTRAVENOUS AS NEEDED
Status: DISCONTINUED | OUTPATIENT
Start: 2022-08-08 | End: 2022-08-08

## 2022-08-08 RX ORDER — METOCLOPRAMIDE HYDROCHLORIDE 5 MG/ML
10 INJECTION INTRAMUSCULAR; INTRAVENOUS EVERY 4 HOURS PRN
Status: DISCONTINUED | OUTPATIENT
Start: 2022-08-08 | End: 2022-08-08 | Stop reason: HOSPADM

## 2022-08-08 RX ORDER — PROPOFOL 10 MG/ML
INJECTION, EMULSION INTRAVENOUS AS NEEDED
Status: DISCONTINUED | OUTPATIENT
Start: 2022-08-08 | End: 2022-08-08

## 2022-08-08 RX ORDER — MAGNESIUM HYDROXIDE 1200 MG/15ML
LIQUID ORAL AS NEEDED
Status: DISCONTINUED | OUTPATIENT
Start: 2022-08-08 | End: 2022-08-08 | Stop reason: HOSPADM

## 2022-08-08 RX ORDER — GLYCOPYRROLATE 0.2 MG/ML
INJECTION INTRAMUSCULAR; INTRAVENOUS AS NEEDED
Status: DISCONTINUED | OUTPATIENT
Start: 2022-08-08 | End: 2022-08-08

## 2022-08-08 RX ORDER — FENTANYL CITRATE 50 UG/ML
INJECTION, SOLUTION INTRAMUSCULAR; INTRAVENOUS AS NEEDED
Status: DISCONTINUED | OUTPATIENT
Start: 2022-08-08 | End: 2022-08-08

## 2022-08-08 RX ORDER — ACETAMINOPHEN 325 MG/1
650 TABLET ORAL EVERY 6 HOURS PRN
Status: DISCONTINUED | OUTPATIENT
Start: 2022-08-08 | End: 2022-08-11 | Stop reason: HOSPADM

## 2022-08-08 RX ORDER — OXYCODONE HYDROCHLORIDE 5 MG/1
5 TABLET ORAL EVERY 4 HOURS PRN
Status: DISCONTINUED | OUTPATIENT
Start: 2022-08-08 | End: 2022-08-11 | Stop reason: HOSPADM

## 2022-08-08 RX ORDER — SUCCINYLCHOLINE/SOD CL,ISO/PF 100 MG/5ML
SYRINGE (ML) INTRAVENOUS AS NEEDED
Status: DISCONTINUED | OUTPATIENT
Start: 2022-08-08 | End: 2022-08-08

## 2022-08-08 RX ORDER — FEBUXOSTAT 40 MG/1
40 TABLET, FILM COATED ORAL DAILY
COMMUNITY
Start: 2022-06-27

## 2022-08-08 RX ORDER — OXYCODONE HYDROCHLORIDE 5 MG/1
5 TABLET ORAL EVERY 4 HOURS PRN
Status: DISCONTINUED | OUTPATIENT
Start: 2022-08-08 | End: 2022-08-08

## 2022-08-08 RX ORDER — INSULIN LISPRO 100 [IU]/ML
2-12 INJECTION, SOLUTION INTRAVENOUS; SUBCUTANEOUS
Status: DISCONTINUED | OUTPATIENT
Start: 2022-08-08 | End: 2022-08-11 | Stop reason: HOSPADM

## 2022-08-08 RX ORDER — HYDRALAZINE HYDROCHLORIDE 20 MG/ML
5 INJECTION INTRAMUSCULAR; INTRAVENOUS EVERY 6 HOURS PRN
Status: DISCONTINUED | OUTPATIENT
Start: 2022-08-08 | End: 2022-08-11 | Stop reason: HOSPADM

## 2022-08-08 RX ADMIN — FENTANYL CITRATE 50 MCG: 50 INJECTION INTRAMUSCULAR; INTRAVENOUS at 18:36

## 2022-08-08 RX ADMIN — PHENYLEPHRINE HYDROCHLORIDE 50 MCG/MIN: 10 INJECTION INTRAVENOUS at 19:04

## 2022-08-08 RX ADMIN — FENTANYL CITRATE 25 MCG: 50 INJECTION INTRAMUSCULAR; INTRAVENOUS at 20:01

## 2022-08-08 RX ADMIN — SODIUM CHLORIDE 125 ML/HR: 0.9 INJECTION, SOLUTION INTRAVENOUS at 19:52

## 2022-08-08 RX ADMIN — GLYCOPYRROLATE 0.1 MG: 0.2 INJECTION, SOLUTION INTRAMUSCULAR; INTRAVENOUS at 18:26

## 2022-08-08 RX ADMIN — FENTANYL CITRATE 25 MCG: 50 INJECTION INTRAMUSCULAR; INTRAVENOUS at 20:07

## 2022-08-08 RX ADMIN — FENTANYL CITRATE 25 MCG: 50 INJECTION INTRAMUSCULAR; INTRAVENOUS at 20:11

## 2022-08-08 RX ADMIN — DEXAMETHASONE SODIUM PHOSPHATE 10 MG: 10 INJECTION, SOLUTION INTRAMUSCULAR; INTRAVENOUS at 18:54

## 2022-08-08 RX ADMIN — LIDOCAINE HYDROCHLORIDE 50 MG: 10 INJECTION, SOLUTION EPIDURAL; INFILTRATION; INTRACAUDAL; PERINEURAL at 18:38

## 2022-08-08 RX ADMIN — SODIUM CHLORIDE, SODIUM LACTATE, POTASSIUM CHLORIDE, AND CALCIUM CHLORIDE: .6; .31; .03; .02 INJECTION, SOLUTION INTRAVENOUS at 19:19

## 2022-08-08 RX ADMIN — CALCIUM CHLORIDE 0.5 G: 100 INJECTION INTRAVENOUS; INTRAVENTRICULAR at 19:00

## 2022-08-08 RX ADMIN — FENTANYL CITRATE 50 MCG: 50 INJECTION INTRAMUSCULAR; INTRAVENOUS at 18:32

## 2022-08-08 RX ADMIN — OXYCODONE HYDROCHLORIDE 5 MG: 5 TABLET ORAL at 20:57

## 2022-08-08 RX ADMIN — FENTANYL CITRATE 25 MCG: 50 INJECTION INTRAMUSCULAR; INTRAVENOUS at 19:56

## 2022-08-08 RX ADMIN — CEFEPIME HYDROCHLORIDE 2000 MG: 2 INJECTION, POWDER, FOR SOLUTION INTRAVENOUS at 19:11

## 2022-08-08 RX ADMIN — PROPOFOL 200 MG: 10 INJECTION, EMULSION INTRAVENOUS at 18:38

## 2022-08-08 RX ADMIN — CALCIUM CHLORIDE 0.5 G: 100 INJECTION INTRAVENOUS; INTRAVENTRICULAR at 18:58

## 2022-08-08 RX ADMIN — EPHEDRINE SULFATE 15 MG: 50 INJECTION INTRAVENOUS at 18:58

## 2022-08-08 RX ADMIN — MIDAZOLAM 2 MG: 1 INJECTION INTRAMUSCULAR; INTRAVENOUS at 18:26

## 2022-08-08 RX ADMIN — Medication 100 MG: at 18:38

## 2022-08-08 RX ADMIN — SODIUM CHLORIDE, SODIUM LACTATE, POTASSIUM CHLORIDE, AND CALCIUM CHLORIDE: .6; .31; .03; .02 INJECTION, SOLUTION INTRAVENOUS at 18:11

## 2022-08-08 RX ADMIN — ONDANSETRON 4 MG: 2 INJECTION INTRAMUSCULAR; INTRAVENOUS at 18:26

## 2022-08-08 NOTE — ED ATTENDING ATTESTATION
8/8/2022  I, Violet Chaves MD, saw and evaluated the patient  I have discussed the patient with the resident/non-physician practitioner and agree with the resident's/non-physician practitioner's findings, Plan of Care, and MDM as documented in the resident's/non-physician practitioner's note, except where noted  All available labs and Radiology studies were reviewed  I was present for key portions of any procedure(s) performed by the resident/non-physician practitioner and I was immediately available to provide assistance  At this point I agree with the current assessment done in the Emergency Department  I have conducted an independent evaluation of this patient a history and physical is as follows:    ED Course     Patient is a 60-year-old male referred by urology office for admission for right ureteral stone  Patient scheduled for surgery today  Patient noted to be febrile associated symptoms include dizziness and nausea  Impression:  Fever history of right ureteral stone  Plan check urinalysis culture blood cultures lactate screening labs  Antibiotics IV fluids admission  Patient taken directly to OR per urology    Medicine service notified of patient's disposition        Critical Care Time  Procedures

## 2022-08-08 NOTE — TELEPHONE ENCOUNTER
Advised patient of CT findings and preliminary urine findings  Advised of abx sent and Flomax  Patient concerned on how the medication would affect his kidney due to his CKD  Did advised him the UTI and obstructing stone would cause damage to his kidneys and it would be wise to take these short term medications  Advised were still waiting for final cultures and will call only if meds need to be changed    Advised we would like him to be seen this week  And he said he's only available tomorrow  Patient lives in [place] and was able to be scheduled at [facility] end location       Office to monitor for final UC

## 2022-08-08 NOTE — TELEPHONE ENCOUNTER
CT is now showing an obstructing stone on the left  Previously stone was nonobstructive, he was scheduled for surgery this September  Additionally his urine cultures preliminary positive  Keflex has been sent to his pharmacy  Additionally I have sent Flomax to his pharmacy to assist with stone passage  He should be seen within the next week, can utilize same day next dayspot  If any severe pain or fever he needs to go to the ER

## 2022-08-08 NOTE — OR NURSING
Security came and collected patient valuables: 1 wallet, $250 cash, 4 visa cards, 3 master cards, 1 dL      [facility] #:[id number]

## 2022-08-08 NOTE — ANESTHESIA POSTPROCEDURE EVALUATION
Post-Op Assessment Note    CV Status:  Stable  Pain Score: 0    Pain management: adequate     Mental Status:  Awake   Hydration Status:  Stable   PONV Controlled:  None   Airway Patency:  Patent      Post Op Vitals Reviewed: Yes      Staff: CRNA         No complications documented      /77 (08/08/22 1935)    Temp 99 3 °F (37 4 °C) (08/08/22 1935)    Pulse (!) 106 (08/08/22 1935)   Resp 21 (08/08/22 1935)    SpO2 97 % (08/08/22 1935)

## 2022-08-08 NOTE — TELEPHONE ENCOUNTER
Spoke to patient to advise of going to [facility] in [place] per AP's note  Address provided  Patient stated he did not  the medication that was prescribed earlier  Advised to not pick it up and go straight to the hospital  Advised his appointment for tomorrow will be canceled  Patient states he has not drank or eaten anything  Advised to try not to do that due to having surgery  Patient is understanding

## 2022-08-08 NOTE — ED PROVIDER NOTES
History  Chief Complaint   Patient presents with    Flank Pain     Pt was sent over by Dr Giselle Estrada for admission due to right sided kidney stone  Pt is due to have surgery today  Pt has been having right flank pain for one week  Pt was initially told to get medications by urology and be seen in the office tomorrow but redirected for admission and surgery  Pt is also nauseated and dizzy  57 y/o male with hx of known left ureteral stone, obstructing, with signs of infection on outpatient UA, presents to the ED on referral from urology office for evaluation  The patient states that he had been following urology for left-sided flank pain over the last week and was found to have a 5 mm x 4 mm mid left ureteral calculus with signs of obstruction on CT imaging  His urinalysis also had findings consistent with infection  He was called by the urology office today to report to the ER for evaluation and urological intervention  He also reports nausea and lightheadedness  No other symptoms or complaints  Prior to Admission Medications   Prescriptions Last Dose Informant Patient Reported? Taking?    Colchicine 0 6 MG CAPS  Self Yes No   Sig: daily   Farxiga 5 MG TABS  Self Yes No   Sig: daily   Lancets (OneTouch Delica Plus [id number]) MISC  Self Yes No   OneTouch Verio test strip  Self Yes No   Rybelsus 7 MG TABS  Self Yes No   Sig: daily   amLODIPine (NORVASC) 10 mg tablet  Self Yes No   Sig: Take 10 mg by mouth daily   cephalexin (KEFLEX) 500 mg capsule   No No   Sig: Take 1 capsule (500 mg total) by mouth every 12 (twelve) hours for 7 days   febuxostat (ULORIC) 40 mg tablet   Yes No   Sig: Take 40 mg by mouth daily   fluticasone (FLONASE) 50 mcg/act nasal spray  Self Yes No   Sig: as needed   lidocaine (LIDODERM) 5 %  Self Yes No   Sig: as needed   losartan (COZAAR) 100 MG tablet  Self Yes No   Sig: Take 100 mg by mouth daily   tamsulosin (FLOMAX) 0 4 mg   No No   Sig: Take 1 capsule (0 4 mg total) by mouth daily with dinner      Facility-Administered Medications: None       Past Medical History:   Diagnosis Date    Diabetes mellitus ([address] )     Elevated PSA     Gout     Hypertension     Renal disorder        Past Surgical History:   Procedure Laterality Date    FL RETROGRADE PYELOGRAM  8/8/2022    HIP SURGERY Left     osteonecrosis    KNEE CARTILAGE SURGERY Left     [place] CYSTOURETHROSCOPY,URETER CATHETER Bilateral 8/8/2022    Procedure: CYSTOSCOPY RETROGRADE PYELOGRAM WITH INSERTION STENT URETERAL;  Surgeon: Rommel Young MD;  [facility];  Service: Urology    ROTATOR CUFF REPAIR Left        Family History   Problem Relation Age of Onset    No Known Problems Father     Gallbladder disease Mother     Colon cancer Maternal Grandmother     Thyroid disease Sister     Asthma Sister     Gallbladder disease Sister         recent removal of gallbladder    No Known Problems Son      I have reviewed and agree with the history as documented  E-Cigarette/Vaping    E-Cigarette Use Never User      E-Cigarette/Vaping Substances    Nicotine No     THC No     CBD No     Flavoring No     Other No     Unknown No      Social History     Tobacco Use    Smoking status: Never Smoker    Smokeless tobacco: Never Used   Vaping Use    Vaping Use: Never used   Substance Use Topics    Alcohol use: Not Currently    Drug use: Not Currently        Review of Systems   Constitutional: Positive for fever  Negative for chills  HENT: Negative for congestion, rhinorrhea and sore throat  Respiratory: Negative for cough and shortness of breath  Cardiovascular: Negative for chest pain and palpitations  Gastrointestinal: Positive for nausea  Negative for abdominal pain, diarrhea and vomiting  Genitourinary: Positive for flank pain and hematuria  Musculoskeletal: Negative for back pain and neck pain  Neurological: Positive for light-headedness  Negative for weakness, numbness and headaches     All other systems reviewed and are negative  Physical Exam  ED Triage Vitals   Temperature Pulse Respirations Blood Pressure SpO2   08/08/22 1702 08/08/22 1702 08/08/22 1702 08/08/22 1702 08/08/22 1702   (!) 101 3 °F (38 5 °C) 94 20 113/79 96 %      Temp Source Heart Rate Source Patient Position - Orthostatic VS BP Location FiO2 (%)   08/08/22 1702 08/08/22 1702 -- -- 08/08/22 1935   Tympanic Monitor   100      Pain Score       08/08/22 1702       4             Orthostatic Vital Signs  Vitals:    08/10/22 2307 08/11/22 0739 08/11/22 1442 08/11/22 1918   BP: 112/75 129/84 124/81 121/74   Pulse: 66  75        Physical Exam  Vitals and nursing note reviewed  Constitutional:       General: He is in acute distress  Appearance: Normal appearance  He is obese  He is ill-appearing  HENT:      Head: Normocephalic and atraumatic  Right Ear: External ear normal       Left Ear: External ear normal       Nose: Nose normal       Mouth/Throat:      Mouth: Mucous membranes are moist       Pharynx: Oropharynx is clear  No oropharyngeal exudate or posterior oropharyngeal erythema  Eyes:      Extraocular Movements: Extraocular movements intact  Conjunctiva/sclera: Conjunctivae normal       Pupils: Pupils are equal, round, and reactive to light  Cardiovascular:      Rate and Rhythm: Normal rate and regular rhythm  Pulses: Normal pulses  Heart sounds: Normal heart sounds  Pulmonary:      Effort: Pulmonary effort is normal  No respiratory distress  Breath sounds: Normal breath sounds  No wheezing or rales  Abdominal:      General: Abdomen is flat  Bowel sounds are normal  There is no distension  Palpations: Abdomen is soft  Tenderness: There is no abdominal tenderness  There is left CVA tenderness  There is no right CVA tenderness or guarding  Musculoskeletal:         General: No swelling or tenderness  Normal range of motion        Cervical back: Normal range of motion and neck supple  No tenderness  Skin:     General: Skin is warm and dry  Neurological:      General: No focal deficit present  Mental Status: He is alert and oriented to person, place, and time  ED Medications  Medications   cefepime (MAXIPIME) 2,000 mg in dextrose 5 % 50 mL IVPB (2,000 mg Intravenous New Bag 8/8/22 1911)   fentaNYL (SUBLIMAZE) injection 25 mcg (25 mcg Intravenous Given 8/8/22 2011)       Diagnostic Studies  Results Reviewed     Procedure Component Value Units Date/Time    Blood culture #1 [[id number]] Collected: 08/08/22 1750    Lab Status: Final result Specimen: Blood from Arm, Right Updated: 08/13/22 2304     Blood Culture No Growth After 5 Days      Urine culture [[id number]]  (Abnormal)  (Susceptibility) Collected: 08/08/22 1735    Lab Status: Final result Specimen: Urine, Clean Catch Updated: 08/11/22 0844     Urine Culture >100,000 cfu/ml Escherichia coli    Susceptibility     Escherichia coli (1)     Antibiotic Interpretation Microscan   Method Status    ZID Performed  Yes  EDWIGE Final    Ampicillin ($$) Susceptible <=8 00 ug/ml EDWIGE Final    Aztreonam ($$$)  Susceptible <=4 ug/ml EDWIGE Final    Cefazolin ($) Susceptible <=2 00 ug/ml EDWIGE Final    Ciprofloxacin ($)  Susceptible <=0 25 ug/ml EDWIGE Final    Gentamicin ($$) Susceptible <=2 ug/ml EDWIGE Final    Levofloxacin ($) Susceptible <=0 50 ug/ml EDWIGE Final    Nitrofurantoin Susceptible <=32 ug/ml EDWIGE Final    Tetracycline Susceptible <=4 ug/ml EDWIGE Final    Tobramycin ($) Susceptible <=2 ug/ml EDWIGE Final    Trimethoprim + Sulfamethoxazole ($$$) Susceptible <=0 5/9 5 ug/ml EDWIGE Final                   Blood culture #2 [[id number]]  (Abnormal)  (Susceptibility) Collected: 08/08/22 1750    Lab Status: Final result Specimen: Blood from Arm, Left Updated: 08/11/22 0746     Blood Culture Escherichia coli     Gram Stain Result Gram negative rods    Narrative:      Refer to Blood Culture Identification Panel results      Susceptibility     Escherichia coli (1)     Antibiotic Interpretation Microscan   Method Status    Ampicillin ($$) Susceptible <=8 00 ug/ml EDWIGE Final    Aztreonam ($$$)  Susceptible <=4 ug/ml EDWIGE Final    Cefazolin ($) Susceptible <=2 00 ug/ml EDWIGE Final    Ciprofloxacin ($)  Susceptible <=0 25 ug/ml EDWIGE Final    Gentamicin ($$) Susceptible <=2 ug/ml EDWIGE Final    Levofloxacin ($) Susceptible <=0 50 ug/ml EDWIGE Final    Tetracycline Susceptible <=4 ug/ml EDWIGE Final    Tobramycin ($) Susceptible <=2 ug/ml EDWIGE Final    Trimethoprim + Sulfamethoxazole ($$$) Susceptible <=0 5/9 5 ug/ml EDWIGE Final                   Blood Culture Identification Panel [[id number]]  (Abnormal) Collected: 08/08/22 1750    Lab Status: Final result Specimen: Blood from Arm, Left Updated: 08/11/22 0746     Escherichia coli Detected    Narrative:      Film Array panel tests for 11 gram positive organisms, 15 gram negative organisms, 7 yeast species and 10 resistance genes  COVID only [[id number]]  (Normal) Collected: 08/08/22 1933    Lab Status: Final result Specimen: Nares from Nose Updated: 08/08/22 2057     SARS-CoV-2 Negative    Narrative:      FOR PEDIATRIC PATIENTS - copy/paste COVID Guidelines URL to browser: [email or web address]  [employer]    SARS-CoV-2 assay is a Nucleic Acid Amplification assay intended for the  qualitative detection of nucleic acid from SARS-CoV-2 in nasopharyngeal  swabs  Results are for the presumptive identification of SARS-CoV-2 RNA  Positive results are indicative of infection with SARS-CoV-2, the virus  causing COVID-19, but do not rule out bacterial infection or co-infection  with other viruses  Laboratories within the [place] and its  territories are required to report all positive results to the appropriate  public health authorities  Negative results do not preclude SARS-CoV-2  infection and should not be used as the sole basis for treatment or other  patient management decisions   Negative results must be combined with  clinical observations, patient history, and epidemiological information  This test has not been FDA cleared or approved  This test has been authorized by FDA under an Emergency Use Authorization  (EUA)  This test is only authorized for the duration of time the  declaration that circumstances exist justifying the authorization of the  emergency use of an in vitro diagnostic tests for detection of SARS-CoV-2  virus and/or diagnosis of COVID-19 infection under section 564(b)(1) of  the Act, 21 U  S C  886WON-8(Q)(6), unless the authorization is terminated  or revoked sooner  The test has been validated but independent review by FDA  and CLIA is pending  Test performed using [employer] GeneXpert: This RT-PCR assay targets N2,  a region unique to SARS-CoV-2  A conserved region in the E-gene was chosen  for pan-Sarbecovirus detection which includes SARS-CoV-2  Lactic acid [[id number]]  (Abnormal) Collected: 08/08/22 1750    Lab Status: Final result Specimen: Blood from Arm, Right Updated: 08/08/22 1854     LACTIC ACID 2 6 mmol/L     Narrative:      Result may be elevated if tourniquet was used during collection      Comprehensive metabolic panel [[id number]]  (Abnormal) Collected: 08/08/22 1750    Lab Status: Final result Specimen: Blood from Arm, Right Updated: 08/08/22 1851     Sodium 132 mmol/L      Potassium 3 8 mmol/L      Chloride 100 mmol/L      CO2 25 mmol/L      ANION GAP 7 mmol/L      BUN 31 mg/dL      Creatinine 2 56 mg/dL      Glucose 171 mg/dL      Calcium 9 5 mg/dL      Corrected Calcium 10 4 mg/dL      AST 27 U/L      ALT 35 U/L      Alkaline Phosphatase 122 U/L      Total Protein 8 8 g/dL      Albumin 2 9 g/dL      Total Bilirubin 1 20 mg/dL      eGFR 27 ml/min/1 73sq m     Narrative:      [place] guidelines for Chronic Kidney Disease (CKD):     Stage 1 with normal or high GFR (GFR > 90 mL/min/1 73 square meters)    Stage 2 Mild CKD (GFR = 60-89 mL/min/1 73 square meters)    Stage 3A Moderate CKD (GFR = 45-59 mL/min/1 73 square meters)    Stage 3B Moderate CKD (GFR = 30-44 mL/min/1 73 square meters)    Stage 4 Severe CKD (GFR = 15-29 mL/min/1 73 square meters)    Stage 5 End Stage CKD (GFR <15 mL/min/1 73 square meters)  Note: GFR calculation is accurate only with a steady state creatinine    Lipase [[id number]]  (Normal) Collected: 08/08/22 1750    Lab Status: Final result Specimen: Blood from Arm, Right Updated: 08/08/22 1846     Lipase 305 u/L     Procalcitonin [[id number]]  (Abnormal) Collected: 08/08/22 1750    Lab Status: Final result Specimen: Blood from Arm, Right Updated: 08/08/22 1846     Procalcitonin 8 30 ng/ml     Protime-INR [[id number]]  (Normal) Collected: 08/08/22 1750    Lab Status: Final result Specimen: Blood from Arm, Right Updated: 08/08/22 1830     Protime 14 5 seconds      INR 1 11    APTT [[id number]]  (Normal) Collected: 08/08/22 1750    Lab Status: Final result Specimen: Blood from Arm, Right Updated: 08/08/22 1830     PTT 32 seconds     CBC and differential [[id number]]  (Abnormal) Collected: 08/08/22 1750    Lab Status: Final result Specimen: Blood from Arm, Right Updated: 08/08/22 1824     WBC 14 88 Thousand/uL      RBC 5 44 Million/uL      Hemoglobin 15 7 g/dL      Hematocrit 47 9 %      MCV 88 fL      MCH 28 9 pg      MCHC 32 8 g/dL      RDW 12 7 %      MPV 10 4 fL      Platelets 818 Thousands/uL      nRBC 0 /100 WBCs      Neutrophils Relative 81 %      Immat GRANS % 1 %      Lymphocytes Relative 9 %      Monocytes Relative 9 %      Eosinophils Relative 0 %      Basophils Relative 0 %      Neutrophils Absolute 11 95 Thousands/µL      Immature Grans Absolute 0 10 Thousand/uL      Lymphocytes Absolute 1 40 Thousands/µL      Monocytes Absolute 1 39 Thousand/µL      Eosinophils Absolute 0 00 Thousand/µL      Basophils Absolute 0 04 Thousands/µL     Urine Microscopic [[id number]]  (Abnormal) Collected: 08/08/22 1735    Lab Status: Final result Specimen: Urine, Clean Catch Updated: 08/08/22 1757     RBC, UA 4-10 /hpf      WBC, UA Innumerable /hpf      Epithelial Cells Occasional /hpf      Bacteria, UA Occasional /hpf      WBC Clumps Present    Urine Macroscopic, POC [[id number]]  (Abnormal) Collected: 08/08/22 1735    Lab Status: Final result Specimen: Urine Updated: 08/08/22 1736     Color, UA Orange     Clarity, UA Cloudy     pH, UA 5 5     Leukocytes, UA Small     Nitrite, UA Negative     Protein, UA >=300 mg/dl      Glucose,  (1/2%) mg/dl      Ketones, UA Negative mg/dl      Urobilinogen, UA 0 2 E U /dl      Bilirubin, UA Negative     Occult Blood, UA Moderate     Specific [place], UA 1 015    Narrative:      CLINITEK RESULT                 FL retrograde pyelogram   Final Result by Nanci Mederos MD (08/09 1046)      Fluoroscopic guidance provided for bilateral retrograde pyelogram and ureteral stent placement  Please see procedure report for further details  Workstation performed: [id number]               Procedures  Procedures      ED Course                                       MDM  Number of Diagnoses or Management Options  Fever  Left ureteral stone  UTI (urinary tract infection)  Diagnosis management comments: This is a 57 y/o male with hx of known left ureteral stone, obstructing, with signs of infection on outpatient UA, presents to the ED on referral from urology office for evaluation  The patient states that he had been following urology for left-sided flank pain over the last week and was found to have a 5 mm x 4 mm mid left ureteral calculus with signs of obstruction on CT imaging  His urinalysis also had findings consistent with infection  He was called by the urology office today to report to the ER for evaluation and urological intervention  He also reports nausea and lightheadedness  No other symptoms or complaints    The patient arrives febrile, otherwise VSS, appears uncomfortable and in mild distress due to symptoms  Left CVA tenderness noted, no anterior abdominal tenderness  Heart with regular rate and rhythm, lungs clear to auscultation bilaterally  After my evaluation the patient, I placed orders including labs for suspected sepsis secondary to obstructing infected left ureteral stone, however the patient was probably take to the OR from the ER  Prior to lab results and administration of antibiotics, the patient was admitted by Urology for ureteral stenting  Care for the patient was assumed by Urology, with internal medicine consulted for further septic management  Disposition  Final diagnoses:   Left ureteral stone   UTI (urinary tract infection)   Fever     Time reflects when diagnosis was documented in both MDM as applicable and the Disposition within this note     Time User Action Codes Description Comment    8/8/2022  5:55 PM Tracey Zackery Add [N20 1] Left ureteral stone     8/8/2022  5:55 PM Tracey Zackery Add [N39 0] UTI (urinary tract infection)     8/8/2022  5:55 PM Tracey Zackery Add [R50 9] Fever     8/8/2022  6:33 PM Lavada Holt Add [E11 65] Uncontrolled type 2 diabetes mellitus with hyperglycemia ([facility] [address] )     8/8/2022  7:12 PM Long, Verna Modify [N20 1] Left ureteral stone     8/11/2022  2:09 PM York Tunica Modify [N20 1] Left ureteral stone     8/11/2022  2:09 PM [place] Tunica Add [N20 1] Ureteral calculus       ED Disposition     ED Disposition   Admit    Condition   Stable    Date/Time   Mon Aug 8, 2022  6:12 PM    Comment   Case was discussed with Dr Mirta Green, and the patient's admission status was agreed to be Admission Status: inpatient status to the service of Brandi Graham   Patient to go to the OR with urology, Dr Trenton Baumgarten up With Specialties Details Why Contact Info Additional [facility] [facility] [place] [facility] up We will continue with scheduled follow-up in September  Will contact you with there is any changes to scheduled plan  Please contact us with any additional questions or concerns  May fax work papers to number provided   [address] [facility] [address]    [facility] [place], [address] [place] [address], [facility]          Discharge Medication List as of 8/11/2022  8:36 PM      START taking these medications    Details   ciprofloxacin (CIPRO) 500 mg tablet Take 1 tablet (500 mg total) by mouth every 12 (twelve) hours for 10 days, Starting Thu 8/11/2022, Until Sun 8/21/2022, Normal      docusate sodium (COLACE) 100 mg capsule Take 1 capsule (100 mg total) by mouth 2 (two) times a day as needed for constipation, Starting Thu 8/11/2022, Normal      HYDROcodone-acetaminophen (Norco) 5-325 mg per tablet Take 1 tablet by mouth every 6 (six) hours as needed for pain for up to 10 days Max Daily Amount: 4 tablets, Starting Thu 8/11/2022, Until Sun 8/21/2022 at 2359, Normal      oxybutynin (DITROPAN) 5 mg tablet Take 1 tablet (5 mg total) by mouth 3 (three) times a day as needed (bladder spasms/ stent discomfort), Starting Thu 8/11/2022, Normal         CONTINUE these medications which have CHANGED    Details   tamsulosin (FLOMAX) 0 4 mg Take 1 capsule (0 4 mg total) by mouth daily with dinner, Starting Thu 8/11/2022, Normal         CONTINUE these medications which have NOT CHANGED    Details   amLODIPine (NORVASC) 10 mg tablet Take 10 mg by mouth daily, Historical Med      Colchicine 0 6 MG CAPS daily, Starting Sat 2/12/2022, Historical Med      Farxiga 5 MG TABS daily, Starting Mon 1/24/2022, Historical Med      febuxostat (ULORIC) 40 mg tablet Take 40 mg by mouth daily, Starting Mon 6/27/2022, Historical Med      fluticasone (FLONASE) 50 mcg/act nasal spray as needed, Starting Sat 2/12/2022, Historical Med      Lancets (OneTouch Delica Plus [id number]) MISC Starting Tue 1/25/2022, Historical Med      lidocaine (LIDODERM) 5 % as needed, Starting Mon 2/14/2022, Historical Med      losartan (COZAAR) 100 MG tablet Take 100 mg by mouth daily, Historical Med      OneTouch Verio test strip Historical Med      Rybelsus 7 MG TABS daily, Starting Mon 1/24/2022, Historical Med         STOP taking these medications       cephalexin (KEFLEX) 500 mg capsule Comments:   Reason for Stopping:             Outpatient Discharge Orders   Activity as tolerated     Lifting restrictions     No strenuous exercise     Shower Daily     No driving until     Call provider for:  persistent nausea or vomiting     Call provider for:  severe uncontrolled pain     Call provider for:     Call provider for:  difficulty breathing, headache or visual disturbances     No dressing needed       PDMP Review     None           ED Provider  Attending physically available and evaluated Alexandra Cortez I managed the patient along with the ED Attending      Electronically Signed by         Gabriel Phillips MD  08/17/22 6968

## 2022-08-08 NOTE — TELEPHONE ENCOUNTER
Obstructing stone with positive culture and fever/chills  Please instruct patient to present to the emergency department at [facility] for admission and surgery this hospitalization    Please cancel office visit tomorrow needs to go the hospital instead and dr Dio Bennett or i will see him here today when he arrives

## 2022-08-08 NOTE — TELEPHONE ENCOUNTER
Richardo Dubin, RN 2 days ago     CB          Reason for Disposition   [1] SEVERE pain (e g , excruciating, scale 8-10) AND [2] not improved after pain medicine    Answer Assessment - Initial Assessment Questions  1  LOCATION: "Where does it hurt?" (e g , left, right)      Lower abd,   2  ONSET: "When did the pain start?"      yesterday  3  SEVERITY: "How bad is the pain?" (e g , Scale 1-10; mild, moderate, or severe)    - MILD (1-3): doesn't interfere with normal activities     - MODERATE (4-7): interferes with normal activities or awakens from sleep     - SEVERE (8-10): excruciating pain and patient unable to do normal activities (stays in bed)        severe  4  PATTERN: "Does the pain come and go, or is it constant?"       constant  5  CAUSE: "What do you think is causing the pain?"      Kidney stones   6  OTHER SYMPTOMS:  "Do you have any other symptoms?" (e g , fever, abdominal pain, vomiting, leg weakness, burning with urination, blood in urine)      Chills, diarrhea, sob    Protocols used: [facility] PAIN-ADULT-AH            Documentation      Richardo Dubin, RN  [place], [place] 2 days ago     Richardo Dubin, RN 2 days ago     CB       Regarding: Kidney Stones, Pain  ----- Message from [place]  sent at 8/6/2022  4:57 PM EDT -----  " I had a CT Scan today and Urine Lab Work for Kidney Stones  I need to know if I should go into the Hospital, I am in a lot of pain   I need advise "            Documentation

## 2022-08-08 NOTE — H&P
UROLOGY HISTORY AND PHYSICAL     Name: Adiel Tobias  : 1965  MRN: [id number]  Date of Service: 22      CHIEF COMPLAINT   Left ureteral stone    History of Present Illness:     Adiel Tobias is a 56 y o  male with a history of gross hematuria who underwent hematuria workup in May of 2022  Cystoscopy was unremarkable and CT demonstrated right Bosniak 1 cysts as well as bilateral left>right renal stones  He was scheduled for left ureteroscopy in September  The patient called the office stating that he started to have blood in his urine and flank pain  Stat CT scan was performed 2022 demonstrating interval passage of a stone into the left ureter  Medical history significant for diabetes, hypertension, and gout       PAST MEDICAL HISTORY:     Past Medical History:   Diagnosis Date    Diabetes mellitus ([address] )     Elevated PSA     Hypertension     Renal disorder        PAST SURGICAL HISTORY:     Past Surgical History:   Procedure Laterality Date    HIP SURGERY Left     osteonecrosis    KNEE CARTILAGE SURGERY Left     ROTATOR CUFF REPAIR Left        CURRENT MEDICATIONS:     Current Facility-Administered Medications   Medication Dose Route Frequency Provider Last Rate Last Admin    [MAR Hold] cefTRIAXone (ROCEPHIN) 2,000 mg in dextrose 5 % 50 mL IVPB  2,000 mg Intravenous Once Melquiades Irwin MD           ALLERGIES:   No Known Allergies    SOCIAL HISTORY:     Social History     Socioeconomic History    Marital status: Single     Spouse name: None    Number of children: None    Years of education: None    Highest education level: None   Occupational History    None   Tobacco Use    Smoking status: Never Smoker    Smokeless tobacco: Never Used   Vaping Use    Vaping Use: Never used   Substance and Sexual Activity    Alcohol use: Not Currently    Drug use: Not Currently    Sexual activity: None   Other Topics Concern    None   Social History Narrative    None     Social Determinants of Health     Financial Resource Strain: Not on file   Food Insecurity: Not on file   Transportation Needs: Not on file   Physical Activity: Not on file   Stress: Not on file   Social Connections: Not on file   Intimate Partner Violence: Not on file   Housing Stability: Not on file       FAMILY HISTORY:     Family History   Problem Relation Age of Onset    No Known Problems Father     Gallbladder disease Mother     Colon cancer Maternal Grandmother     Thyroid disease Sister     Asthma Sister     Gallbladder disease Sister         recent removal of gallbladder    No Known Problems Son        REVIEW OF SYSTEMS:     Review of Systems   Constitutional: Positive for chills and fever  Negative for unexpected weight change  HENT: Negative for hearing loss and sore throat  Eyes: Negative for redness and visual disturbance  Respiratory: Negative for cough and shortness of breath  Cardiovascular: Negative for chest pain, palpitations and leg swelling  Gastrointestinal: Positive for nausea  Negative for blood in stool, constipation, diarrhea and vomiting  Endocrine: Negative for cold intolerance and heat intolerance  Genitourinary:        Per HPI   Musculoskeletal: Positive for back pain (left flank pain)  Negative for arthralgias and myalgias  Skin: Negative for color change and rash  Neurological: Negative for dizziness, seizures and headaches  Hematological: Does not bruise/bleed easily  PHYSICAL EXAM:     /79   Pulse 94   Temp (!) 101 3 °F (38 5 °C) (Tympanic)   Resp 20   SpO2 96%     General:  Healthy appearing male in no acute distress  Head:  Normocephalic, atraumatic  Eyes: no scleral icterus  ENMT: Nares patent, moist mucous membranes  Cardiovascular:  Regular rate  Respiratory:  Patient has unlabored respirations     Abdomen:  Abdomen nondistended  Musculoskeletal: Normal gait  Neurological: Grossly intact  Psych: Normal affect      LABS: Pending    IMAGING:     CT ABDOMEN AND PELVIS WITHOUT IV CONTRAST - LOW DOSE RENAL STONE      INDICATION:   R10 9: Unspecified abdominal pain        flank pain, hematuria, hx of stones       pt states flank pain x 3/4 days, was suppose to have sx in july but pushed it off, hx kidney stones, bilateral flank pain     No additional history was afforded in Epic at time of dictation  The patient's entire past medical history is obtained from CT technologist notes only, via copy and paste            COMPARISON:  CT renal protocol March 17, 2022     TECHNIQUE:  Low radiation dose thin section CT examination of the abdomen and pelvis was performed without intravenous or oral contrast according to a protocol specifically designed to evaluate for urinary tract calculus  Axial, sagittal, and coronal 2D   reformatted images were created from the source data and submitted for interpretation  Evaluation for pathology in the abdomen and pelvis that is unrelated to urinary tract calculi is limited        Radiation dose length product (DLP) for this visit:  1601 mGy-cm   This examination, like all CT scans performed in the [facility], was performed utilizing techniques to minimize radiation dose exposure, including the use of iterative   reconstruction and automated exposure control               URINARY TRACT FINDINGS:  RIGHT KIDNEY AND URETER:    2 mm calyceal calculus lower pole (series 2, image 40; series 3, image 120)     No additional renal calyceal calculi are identified      There is mild hydronephrosis and moderate hydroureter, up to the level of the urinary bladder  Mild perinephric and periureteric inflammation, up to the level of the urinary bladder  No ureteric calculi are seen      Stable low-density renal lesions, most compatible with cysts            LEFT KIDNEY AND URETER:    5 mm calyceal calculus anterior interpolar region (series 2, image 33; series 3, image 123)         Moderate hydroureteronephrosis, up to the level of a 5 x 3 x 4 mm calculus in the mid ureter (series 2, image 47; series 3, image 125; series 4, image 84)  This is located at the level of the superior endplate of the L4 vertebral body  This has   migrated from the lower pole of the left kidney      Moderate perinephric and periureteric inflammation, up to the level of the calculus  No perinephric fluid collections are present to suggest an abscess      There is mild hydroureter beyond the calculus, up to the urinary bladder  No distal ureteric calculi are seen      There are stable subcentimeter hyperdense lesions in the kidney, too small to accurately characterize, most compatible with high proteinaceous cysts            URINARY BLADDER:    The bladder is decompressed and inadequately evaluated  There are circumferential, irregular bladder wall thickening, new from the prior study                  ADDITIONAL FINDINGS:  LOWER CHEST:  No clinically significant abnormality identified in the visualized lower chest            SOLID VISCERA: Limited low radiation dose noncontrast CT evaluation demonstrates no clinically significant abnormality of the imaged portions of the spleen, pancreas, or adrenal glands      The liver is enlarged with fatty infiltrative changes          GALLBLADDER/BILIARY TREE:  No calcified gallstones  No pericholecystic inflammatory change  No biliary dilatation            STOMACH AND BOWEL:    Evaluation of the GI tract is limited due to lack of oral contrast material      Stomach incompletely distended with ingested food products and air  Hiatal hernia      No evidence of small bowel obstruction      The colon is segmentally distended with feces  Normal fecal burden in the colon  Scattered diverticula throughout the entire colon  This is most pronounced in the sigmoid colon  Sigmoid colon redundant and tortuous    Nothing to suggest acute   diverticulitis         APPENDIX:  No findings to suggest appendicitis           ABDOMINOPELVIC CAVITY:  No ascites  No pneumoperitoneum  No lymphadenopathy         REPRODUCTIVE ORGANS:  The prostate gland is enlarged and heterogeneous in CT density  Scattered prostatic calcifications  Mild periprosthetic inflammation            ABDOMINAL WALL/INGUINAL REGIONS:  Mild diastases of the rectus abdominis musculature  Umbilical hernia containing fat      Small left inguinal hernia containing fat         OSSEOUS STRUCTURES:  No acute fracture or destructive osseous lesion        Spinal degenerative changes are noted  This is most pronounced L1-L2 and L5-S1  Grade 1 retrolisthesis L5 on S1      Mild degenerative changes right hip      Severe degenerative changes of the left hip  Foreshortening of the left femoral neck  Flattening of the left femoral head  Mild lateral subluxation of the left femoral head  Subchondral sclerosis and subchondral cystic changes in the superior, weight   bearing portion of the left femoral head and left acetabulum  Dystrophic calcifications               IMPRESSION:  1  Moderately obstructing 5 x 4 x 3 mm mid left ureteric calculus located at the level of the superior endplate of the L4 vertebral body  This has migrated from the lower pole of the left kidney  Follow-up urology consultation recommended      2  Additional nonobstructing bilateral renal calyceal calculi  Follow-up urology consultation recommended      3  Abnormal appearance of the urinary bladder, new from the prior study  There is moderate right-sided hydroureter and mild left-sided hydroureter without obstructing distal ureteric calculi  Correlate for ureteral obstruction secondary to cystitis   versus bladder outlet obstruction from prostatic enlargement  Follow-up urology consultation recommended      4   Mild abnormal appearance of the prostate gland  Correlate for acute prostatitis  Follow-up urology consultation recommended      5    Severe degenerative changes of the left hip  If warranted, consider follow-up orthopedic surgery evaluation  ASSESSMENT:     56 y o  male with obstructing left ureteral stone, UTI, and fever  I reviewed his CT scan which demonstrates mild right hydroureteronephrosis and a proximal left ureteral stone with upstream hydro  Given the fevers and positive UTI will plan to stent both sides  Informed consent obtained for cystoscopy, bilateral retrograde pyelogram, bilateral ureteral stent placement      PLAN:     Cefepime on-call  CBC, BMP, lactate, blood cultures pending  Will admit to urology with medicine consult     Rosie Thompson MD  [facility]

## 2022-08-09 LAB
ANION GAP SERPL CALCULATED.3IONS-SCNC: 7 MMOL/L (ref 4–13)
BASOPHILS # BLD AUTO: 0.02 THOUSANDS/ΜL (ref 0–0.1)
BASOPHILS NFR BLD AUTO: 0 % (ref 0–1)
BUN SERPL-MCNC: 35 MG/DL (ref 5–25)
CALCIUM SERPL-MCNC: 9.6 MG/DL (ref 8.3–10.1)
CHLORIDE SERPL-SCNC: 105 MMOL/L (ref 96–108)
CO2 SERPL-SCNC: 25 MMOL/L (ref 21–32)
CREAT SERPL-MCNC: 2.38 MG/DL (ref 0.6–1.3)
EOSINOPHIL # BLD AUTO: 0.03 THOUSAND/ΜL (ref 0–0.61)
EOSINOPHIL NFR BLD AUTO: 0 % (ref 0–6)
ERYTHROCYTE [DISTWIDTH] IN BLOOD BY AUTOMATED COUNT: 13 % (ref 11.6–15.1)
GFR SERPL CREATININE-BSD FRML MDRD: 29 ML/MIN/1.73SQ M
GLUCOSE SERPL-MCNC: 155 MG/DL (ref 65–140)
GLUCOSE SERPL-MCNC: 155 MG/DL (ref 65–140)
GLUCOSE SERPL-MCNC: 230 MG/DL (ref 65–140)
GLUCOSE SERPL-MCNC: 240 MG/DL (ref 65–140)
GLUCOSE SERPL-MCNC: 241 MG/DL (ref 65–140)
HCT VFR BLD AUTO: 44.4 % (ref 36.5–49.3)
HGB BLD-MCNC: 14.7 G/DL (ref 12–17)
IMM GRANULOCYTES # BLD AUTO: 0.13 THOUSAND/UL (ref 0–0.2)
IMM GRANULOCYTES NFR BLD AUTO: 1 % (ref 0–2)
LACTATE SERPL-SCNC: 1.3 MMOL/L (ref 0.5–2)
LYMPHOCYTES # BLD AUTO: 1.02 THOUSANDS/ΜL (ref 0.6–4.47)
LYMPHOCYTES NFR BLD AUTO: 7 % (ref 14–44)
MCH RBC QN AUTO: 29.5 PG (ref 26.8–34.3)
MCHC RBC AUTO-ENTMCNC: 33.1 G/DL (ref 31.4–37.4)
MCV RBC AUTO: 89 FL (ref 82–98)
MONOCYTES # BLD AUTO: 0.53 THOUSAND/ΜL (ref 0.17–1.22)
MONOCYTES NFR BLD AUTO: 4 % (ref 4–12)
NEUTROPHILS # BLD AUTO: 13.4 THOUSANDS/ΜL (ref 1.85–7.62)
NEUTS SEG NFR BLD AUTO: 88 % (ref 43–75)
NRBC BLD AUTO-RTO: 0 /100 WBCS
PLATELET # BLD AUTO: 225 THOUSANDS/UL (ref 149–390)
PMV BLD AUTO: 9.9 FL (ref 8.9–12.7)
POTASSIUM SERPL-SCNC: 4.3 MMOL/L (ref 3.5–5.3)
PROCALCITONIN SERPL-MCNC: 5.55 NG/ML
RBC # BLD AUTO: 4.99 MILLION/UL (ref 3.88–5.62)
SODIUM SERPL-SCNC: 137 MMOL/L (ref 135–147)
WBC # BLD AUTO: 15.13 THOUSAND/UL (ref 4.31–10.16)

## 2022-08-09 PROCEDURE — 83605 ASSAY OF LACTIC ACID: CPT | Performed by: INTERNAL MEDICINE

## 2022-08-09 PROCEDURE — 80048 BASIC METABOLIC PNL TOTAL CA: CPT | Performed by: INTERNAL MEDICINE

## 2022-08-09 PROCEDURE — 85025 COMPLETE CBC W/AUTO DIFF WBC: CPT | Performed by: INTERNAL MEDICINE

## 2022-08-09 PROCEDURE — 87040 BLOOD CULTURE FOR BACTERIA: CPT | Performed by: STUDENT IN AN ORGANIZED HEALTH CARE EDUCATION/TRAINING PROGRAM

## 2022-08-09 PROCEDURE — 99232 SBSQ HOSP IP/OBS MODERATE 35: CPT | Performed by: PHYSICIAN ASSISTANT

## 2022-08-09 PROCEDURE — 99232 SBSQ HOSP IP/OBS MODERATE 35: CPT | Performed by: HOSPITALIST

## 2022-08-09 PROCEDURE — 84145 PROCALCITONIN (PCT): CPT | Performed by: INTERNAL MEDICINE

## 2022-08-09 PROCEDURE — 82948 REAGENT STRIP/BLOOD GLUCOSE: CPT

## 2022-08-09 RX ORDER — SODIUM CHLORIDE 9 MG/ML
100 INJECTION, SOLUTION INTRAVENOUS CONTINUOUS
Status: DISCONTINUED | OUTPATIENT
Start: 2022-08-09 | End: 2022-08-11 | Stop reason: HOSPADM

## 2022-08-09 RX ORDER — SODIUM CHLORIDE, SODIUM LACTATE, POTASSIUM CHLORIDE, CALCIUM CHLORIDE 600; 310; 30; 20 MG/100ML; MG/100ML; MG/100ML; MG/100ML
100 INJECTION, SOLUTION INTRAVENOUS CONTINUOUS
Status: DISCONTINUED | OUTPATIENT
Start: 2022-08-09 | End: 2022-08-09

## 2022-08-09 RX ADMIN — INSULIN LISPRO 2 UNITS: 100 INJECTION, SOLUTION INTRAVENOUS; SUBCUTANEOUS at 16:57

## 2022-08-09 RX ADMIN — TAMSULOSIN HYDROCHLORIDE 0.4 MG: 0.4 CAPSULE ORAL at 16:57

## 2022-08-09 RX ADMIN — AMLODIPINE BESYLATE 10 MG: 10 TABLET ORAL at 10:04

## 2022-08-09 RX ADMIN — CEFEPIME HYDROCHLORIDE 2000 MG: 2 INJECTION, POWDER, FOR SOLUTION INTRAVENOUS at 09:24

## 2022-08-09 RX ADMIN — HEPARIN SODIUM 5000 UNITS: 5000 INJECTION INTRAVENOUS; SUBCUTANEOUS at 06:27

## 2022-08-09 RX ADMIN — HEPARIN SODIUM 5000 UNITS: 5000 INJECTION INTRAVENOUS; SUBCUTANEOUS at 13:29

## 2022-08-09 RX ADMIN — HEPARIN SODIUM 5000 UNITS: 5000 INJECTION INTRAVENOUS; SUBCUTANEOUS at 21:01

## 2022-08-09 RX ADMIN — SODIUM CHLORIDE, SODIUM LACTATE, POTASSIUM CHLORIDE, AND CALCIUM CHLORIDE 125 ML/HR: .6; .31; .03; .02 INJECTION, SOLUTION INTRAVENOUS at 13:33

## 2022-08-09 RX ADMIN — SODIUM CHLORIDE 100 ML/HR: 0.9 INJECTION, SOLUTION INTRAVENOUS at 16:58

## 2022-08-09 RX ADMIN — SODIUM CHLORIDE, SODIUM LACTATE, POTASSIUM CHLORIDE, AND CALCIUM CHLORIDE 125 ML/HR: .6; .31; .03; .02 INJECTION, SOLUTION INTRAVENOUS at 04:46

## 2022-08-09 RX ADMIN — CEFTRIAXONE 2000 MG: 10 INJECTION, POWDER, FOR SOLUTION INTRAVENOUS at 21:00

## 2022-08-09 RX ADMIN — INSULIN LISPRO 2 UNITS: 100 INJECTION, SOLUTION INTRAVENOUS; SUBCUTANEOUS at 21:01

## 2022-08-09 RX ADMIN — OXYCODONE HYDROCHLORIDE 5 MG: 5 TABLET ORAL at 21:06

## 2022-08-09 RX ADMIN — INSULIN LISPRO 4 UNITS: 100 INJECTION, SOLUTION INTRAVENOUS; SUBCUTANEOUS at 06:27

## 2022-08-09 RX ADMIN — INSULIN LISPRO 4 UNITS: 100 INJECTION, SOLUTION INTRAVENOUS; SUBCUTANEOUS at 11:11

## 2022-08-09 RX ADMIN — OXYCODONE HYDROCHLORIDE 5 MG: 5 TABLET ORAL at 06:38

## 2022-08-09 NOTE — ASSESSMENT & PLAN NOTE
Acute worsening in the setting of obstructive pyelonephritis  Baseline creatinine approximate 1 4-1 5 over the last year @ [facility] - presents today @ 2 56  Anticipate improvement w/ management of sepsis and genitourinary decompression/stenting   Continue IV fluids - monitor renal function and urine output - limit/avoid nephrotoxins possible - avoid hypotension as possible - holding Cozaar

## 2022-08-09 NOTE — ASSESSMENT & PLAN NOTE
Lab Results   Component Value Date    HGBA1C 5 7 02/10/2022     Hold oral hypoglycemics while hospitalized  Initiate SSI coverage per Accu-Cheks  Aggressive blood sugar control in the setting of sepsis  Carbohydrate restricted diet

## 2022-08-09 NOTE — PLAN OF CARE
Problem: GENITOURINARY - ADULT  Goal: Maintains or returns to baseline urinary function  Description: INTERVENTIONS:  - Assess urinary function  - Encourage oral fluids to ensure adequate hydration if ordered  - Administer IV fluids as ordered to ensure adequate hydration  - Administer ordered medications as needed  - Offer frequent toileting  - Follow urinary retention protocol if ordered  Outcome: Progressing  Goal: Urinary catheter remains patent  Description: INTERVENTIONS:  - Assess patency of urinary catheter  - If patient has a chronic lopez, consider changing catheter if non-functioning  - Follow guidelines for intermittent irrigation of non-functioning urinary catheter  Outcome: Progressing     Problem: PAIN - ADULT  Goal: Verbalizes/displays adequate comfort level or baseline comfort level  Description: Interventions:  - Encourage patient to monitor pain and request assistance  - Assess pain using appropriate pain scale  - Administer analgesics based on type and severity of pain and evaluate response  - Implement non-pharmacological measures as appropriate and evaluate response  - Consider cultural and social influences on pain and pain management  - Notify physician/advanced practitioner if interventions unsuccessful or patient reports new pain  Outcome: Progressing     Problem: INFECTION - ADULT  Goal: Absence or prevention of progression during hospitalization  Description: INTERVENTIONS:  - Assess and monitor for signs and symptoms of infection  - Monitor lab/diagnostic results  - Monitor all insertion sites, i e  indwelling lines, tubes, and drains  - Monitor endotracheal if appropriate and nasal secretions for changes in amount and color  - [place] appropriate cooling/warming therapies per order  - Administer medications as ordered  - Instruct and encourage patient and family to use good hand hygiene technique  - Identify and instruct in appropriate isolation precautions for identified infection/condition  Outcome: Progressing

## 2022-08-09 NOTE — PLAN OF CARE
Problem: GENITOURINARY - ADULT  Goal: Maintains or returns to baseline urinary function  Description: INTERVENTIONS:  - Assess urinary function  - Encourage oral fluids to ensure adequate hydration if ordered  - Administer IV fluids as ordered to ensure adequate hydration  - Administer ordered medications as needed  - Offer frequent toileting  - Follow urinary retention protocol if ordered  Outcome: Progressing  Goal: Urinary catheter remains patent  Description: INTERVENTIONS:  - Assess patency of urinary catheter  - If patient has a chronic lopez, consider changing catheter if non-functioning  - Follow guidelines for intermittent irrigation of non-functioning urinary catheter  Outcome: Progressing     Problem: PAIN - ADULT  Goal: Verbalizes/displays adequate comfort level or baseline comfort level  Description: Interventions:  - Encourage patient to monitor pain and request assistance  - Assess pain using appropriate pain scale  - Administer analgesics based on type and severity of pain and evaluate response  - Implement non-pharmacological measures as appropriate and evaluate response  - Consider cultural and social influences on pain and pain management  - Notify physician/advanced practitioner if interventions unsuccessful or patient reports new pain  Outcome: Progressing     Problem: INFECTION - ADULT  Goal: Absence or prevention of progression during hospitalization  Description: INTERVENTIONS:  - Assess and monitor for signs and symptoms of infection  - Monitor lab/diagnostic results  - Monitor all insertion sites, i e  indwelling lines, tubes, and drains  - Monitor endotracheal if appropriate and nasal secretions for changes in amount and color  - [place] appropriate cooling/warming therapies per order  - Administer medications as ordered  - Instruct and encourage patient and family to use good hand hygiene technique  - Identify and instruct in appropriate isolation precautions for identified infection/condition  Outcome: Progressing     Problem: DISCHARGE PLANNING  Goal: Discharge to home or other facility with appropriate resources  Description: INTERVENTIONS:  - Identify barriers to discharge w/patient and caregiver  - Arrange for needed discharge resources and transportation as appropriate  - Identify discharge learning needs (meds, wound care, etc )  - Arrange for interpretive services to assist at discharge as needed  - Refer to Case Management Department for coordinating discharge planning if the patient needs post-hospital services based on physician/advanced practitioner order or complex needs related to functional status, cognitive ability, or social support system  Outcome: Progressing

## 2022-08-09 NOTE — ASSESSMENT & PLAN NOTE
CT imaging on 8/6 revealed: "Moderately obstructing 5 x 4 x 3 mm mid left ureteric calculus located at the level of the superior endplate of the L4 vertebral body  This has migrated from the lower pole of the left kidney  Saw Cha Additional nonobstructing bilateral renal calyceal calculi  Saw Cha Abnormal appearance of the urinary bladder, new from the prior study  There is moderate right-sided hydroureter and mild left-sided hydroureter without obstructing distal ureteric calculi  Correlate for ureteral obstruction secondary to cystitis versus bladder outlet obstruction from prostatic enlargement  Saw Cha Mild abnormal appearance of the prostate gland    Correlate for acute prostatitis "  Urgently taken to the OR from the ED w/ plan for cystoscopy with retrograde pyelogram and bilateral ureteral stenting in the setting of sepsis  Continue IV fluids and IV Cefepime -> await urine culture  PRN pain/emesis control  Monitor renal function  Further management and postoperative recommendations per primary service (urology) - initiated on Flomax

## 2022-08-09 NOTE — ASSESSMENT & PLAN NOTE
Acute worsening in the setting of obstructive pyelonephritis  Baseline creatinine approximate 1 4-1 5 over the last year @ [facility] - presented @ 2 56 -> now 2 3  Anticipate improvement w/ management of sepsis and genitourinary decompression/stenting   Continue IV fluids - monitor renal function and urine output - limit/avoid nephrotoxins possible - avoid hypotension as possible - holding Cozaar

## 2022-08-09 NOTE — ANESTHESIA PREPROCEDURE EVALUATION
Procedure:  CYSTOSCOPY RETROGRADE PYELOGRAM WITH INSERTION STENT URETERAL (Bilateral Bladder)    Relevant Problems   CARDIO   (+) Essential hypertension      ENDO   (+) Diabetes mellitus type 2       /RENAL   (+) Acute kidney injury superimposed on chronic kidney disease stage 3    (+) Nephrolithiasis   (+) Obstructive pyelonephritis   (+) Renal cyst      Other   (+) Morbid obesity   (+) Sepsis on admission        Physical Exam    Airway    Mallampati score: II  TM Distance: >3 FB  Neck ROM: full     Dental   No notable dental hx     Cardiovascular      Pulmonary      Other Findings        Anesthesia Plan  ASA Score- 3     Anesthesia Type- general with ASA Monitors  Additional Monitors:   Airway Plan: ETT  Comment: Ongoing nausea  Currently feels the urge to vomit  Discussed plan for ETT with RSI  Plan Factors-Exercise tolerance (METS): >4 METS  Chart reviewed  Existing labs reviewed  Patient summary reviewed  Induction- intravenous and rapid sequence induction  Postoperative Plan- Plan for postoperative opioid use  Planned trial extubation    Informed Consent- Anesthetic plan and risks discussed with patient  I personally reviewed this patient with the CRNA  Discussed and agreed on the Anesthesia Plan with the CRNA  Lexi Navarrete

## 2022-08-09 NOTE — ASSESSMENT & PLAN NOTE
Optimize pain control  Continue Norvasc - held Cozaar preoperatively and in the setting of acute kidney injury (if renal function stable, can resume over the next few days) - PRN IV Hydralazine on board for BP spikes  Low-sodium diet

## 2022-08-09 NOTE — CONSULTS
Meghan 85 1965, 56 y o  male MRN: [id number]  Unit/Bed#: [facility] 821-01 Encounter: [id number]  Primary Care Provider: Montana Nava MD   Date and time admitted to hospital: 8/8/2022  5:23 PM      * Sepsis on admission  Assessment & Plan  Presents with a high-grade fever coupled w/ tachycardia/leukocytosis, along with lactic acidosis and elevated procalcitonin (trend until peak)  In the setting of obstructive pyelonephritis (see below) -> await blood cultures and urine culture (w/ severity of sepsis criteria may possibly have underlying bacteremia)  Monitor vitals and maintain hemodynamics - continue IV fluids  Supportive care otherwise    Obstructive pyelonephritis  Assessment & Plan  CT imaging on 8/6 revealed: "Moderately obstructing 5 x 4 x 3 mm mid left ureteric calculus located at the level of the superior endplate of the L4 vertebral body  This has migrated from the lower pole of the left kidney  Regan Po Regan Po Additional nonobstructing bilateral renal calyceal calculi  Regan Po Regan Po Abnormal appearance of the urinary bladder, new from the prior study  There is moderate right-sided hydroureter and mild left-sided hydroureter without obstructing distal ureteric calculi  Correlate for ureteral obstruction secondary to cystitis versus bladder outlet obstruction from prostatic enlargement  Regan Po Regan Po Mild abnormal appearance of the prostate gland    Correlate for acute prostatitis "  Urgently taken to the OR from the ED w/ plan for cystoscopy with retrograde pyelogram and bilateral ureteral stenting in the setting of sepsis  Continue IV fluids and IV Cefepime -> await urine culture  PRN pain/emesis control  Monitor renal function  Further management and postoperative recommendations per primary service (urology) - initiated on Flomax    Acute kidney injury superimposed on chronic kidney disease stage 3   Assessment & Plan  Acute worsening in the setting of obstructive pyelonephritis  Baseline creatinine approximate 1 4-1 5 over the last year @ [facility] - presents today @ 2 56  Anticipate improvement w/ management of sepsis and genitourinary decompression/stenting   Continue IV fluids - monitor renal function and urine output - limit/avoid nephrotoxins possible - avoid hypotension as possible - holding Cozaar    Essential hypertension  Assessment & Plan  Optimize pain control  Continue Norvasc - held Cozaar preoperatively and in the setting of acute kidney injury (if renal function stable, can resume over the next few days) - PRN IV Hydralazine on board for BP spikes  Low-sodium diet    Diabetes mellitus type 2   Assessment & Plan  Lab Results   Component Value Date    HGBA1C 5 7 02/10/2022     Hold oral hypoglycemics while hospitalized  Initiate SSI coverage per Accu-Cheks  Aggressive blood sugar control in the setting of sepsis  Carbohydrate restricted diet    Morbid obesity  Assessment & Plan  BMI of 41 21  Lifestyle/diet modifications    Hyponatremia  Assessment & Plan  Mildly diminished at 132 - trialed on IV fluids in the setting of severe sepsis  Monitor serum sodium      DVT Prophylaxis:  Heparin SC    Total Time for Visit, including Counseling / Coordination of Care: 72 minutes  Greater than 50% of this total time spent on direct patient counseling and coordination of care  Reason for Consultation:  Assistance in treatment of sepsis and medical management of chronic issues      History of Present Illness:    Marlene Rice is a 56 y o  male who is originally admitted to the urology service on 8/8/2022 due to obstructive pyelonephritis requiring expedited operative intervention  The hospitalist service has been consulted for assistance in management of coexisting sepsis on admission secondary to genitourinary source, along with chronic medical issues    On arrival, patient was noted to meet severe sepsis criteria in the ED, and urgently taken to the OR for cystoscopy with retrograde pyelogram with plan for bilateral ureteral stenting  At the time of my encounter post-procedure, he is resting bed fairly comfortably stating that his flank tenderness has improved  Does acknowledge some weakness/fatigue and postprocedural soreness at times of urination however  Denies any fever/chills at this time  Overall, he remains in pleasant and hopeful spirits  Review of Systems:    Review of Systems - A thorough 12 point review systems was conducted  Pertinent positives and negatives are mentioned in the history of present illness  Past Medical and Surgical History:     Past Medical History:   Diagnosis Date    Diabetes mellitus ([address] )     Elevated PSA     Hypertension     Renal disorder        Past Surgical History:   Procedure Laterality Date    HIP SURGERY Left     osteonecrosis    KNEE CARTILAGE SURGERY Left     ROTATOR CUFF REPAIR Left          Medications & Allergies:    Prior to Admission medications    Medication Sig Start Date End Date Taking?  Authorizing Provider   amLODIPine (NORVASC) 10 mg tablet Take 10 mg by mouth daily    Historical Provider, MD   cephalexin (KEFLEX) 500 mg capsule Take 1 capsule (500 mg total) by mouth every 12 (twelve) hours for 7 days 8/8/22 8/15/22  Palmira Gonzalez PA-C   Colchicine 0 6 MG CAPS daily 2/12/22   Historical Provider, MD   Farxiga 5 MG TABS daily 1/24/22   Historical Provider, MD   fluticasone [facility]) 50 mcg/act nasal spray as needed 2/12/22   Historical Provider, MD   Lancets (OneTouch Delica Plus [id number]) [facility]  1/25/22   Historical Provider, MD   lidocaine (LIDODERM) 5 % as needed 2/14/22   Historical Provider, MD   losartan (COZAAR) 100 MG tablet Take 100 mg by mouth daily    Historical Provider, MD   OneTouch Verio test strip  1/25/22   Historical Provider, MD   Rybelsus 7 MG TABS daily 1/24/22   Historical Provider, MD   tamsulosin (FLOMAX) 0 4 mg Take 1 capsule (0 4 mg total) by mouth daily with dinner 8/8/22 Mahsa Snider PA-C         Allergies: No Known Allergies      Social History:    Substance Use History:   Social History     Substance and Sexual Activity   Alcohol Use Not Currently     Social History     Tobacco Use   Smoking Status Never Smoker   Smokeless Tobacco Never Used     Social History     Substance and Sexual Activity   Drug Use Not Currently         Family History:    Per medical chart, significant for gallbladder disease in mother and sister, for thyroid disease in the sister, for asthma in another sister, and for colon cancer in maternal grandmother        Physical Exam:     Vitals:   Blood Pressure: 110/57 (08/08/22 2015)  Pulse: 90 (08/08/22 2015)  Temperature: 98 9 °F (37 2 °C) (08/08/22 2015)  Temp Source: Skin (08/08/22 1935)  Respirations: 16 (08/08/22 2015)  SpO2: 94 % (08/08/22 2015)      GENERAL:  Obese - weak/fatigued  HEAD:  Normocephalic - atraumatic  EYES: PERRL - EOMI   MOUTH:  Mucosa moist  NECK:  Supple - full range of motion  CARDIAC:  Regular rate/rhythm - S1/S2 positive  PULMONARY:  Fairly clear to auscultation - nonlabored respirations  ABDOMEN:  Soft - nontender/nondistended - active bowel sounds - improved bilateral costovertebral tenderness more prominent on the left  MUSCULOSKELETAL:  Motor strength/range of motion intact  NEUROLOGIC:  Alert/oriented at baseline  SKIN:  Chronic wrinkles/blemishes   PSYCHIATRIC:  Mood/affect stable      Additional Data:     Labs & Recent Cultures:    Results from last 7 days   Lab Units 08/08/22  1750   WBC Thousand/uL 14 88*   HEMOGLOBIN g/dL 15 7   HEMATOCRIT % 47 9   PLATELETS Thousands/uL 264   NEUTROS PCT % 81*   LYMPHS PCT % 9*   MONOS PCT % 9   EOS PCT % 0     Results from last 7 days   Lab Units 08/08/22  1750   SODIUM mmol/L 132*   POTASSIUM mmol/L 3 8   CHLORIDE mmol/L 100   CO2 mmol/L 25   BUN mg/dL 31*   CREATININE mg/dL 2 56*   ANION GAP mmol/L 7   CALCIUM mg/dL 9 5   ALBUMIN g/dL 2 9*   TOTAL BILIRUBIN mg/dL 1 20*   ALK PHOS U/L 122*   ALT U/L 35   AST U/L 27   GLUCOSE RANDOM mg/dL 171*     Results from last 7 days   Lab Units 08/08/22  1750   INR  1 11     Results from last 7 days   Lab Units 08/08/22  1958   POC GLUCOSE mg/dl 146*         Results from last 7 days   Lab Units 08/08/22  1750   LACTIC ACID mmol/L 2 6*   PROCALCITONIN ng/ml 8 30*         Results from last 7 days   Lab Units 08/06/22  0955   URINE CULTURE  >100,000 cfu/ml Escherichia coli*       Lines/Drains:  Invasive Devices  Report    Peripheral Intravenous Line  Duration           Peripheral IV 08/08/22 Right Forearm <1 day          Drain  Duration           Urethral Catheter Coude 18 Fr  <1 day                  Imaging:     CT renal stone study abdomen pelvis wo contrast    Result Date: 8/6/2022  Narrative: CT ABDOMEN AND PELVIS WITHOUT IV CONTRAST - LOW DOSE RENAL STONE INDICATION:   R10 9: Unspecified abdominal pain  flank pain, hematuria, hx of stones  pt states flank pain x 3/4 days, was suppose to have sx in july but pushed it off, hx kidney stones, bilateral flank pain No additional history was afforded in Epic at time of dictation  The patient's entire past medical history is obtained from [employer] technologist notes only, via copy and paste  COMPARISON:  CT renal protocol March 17, 2022 TECHNIQUE:  Low radiation dose thin section CT examination of the abdomen and pelvis was performed without intravenous or oral contrast according to a protocol specifically designed to evaluate for urinary tract calculus  Axial, sagittal, and coronal 2D  reformatted images were created from the source data and submitted for interpretation  Evaluation for pathology in the abdomen and pelvis that is unrelated to urinary tract calculi is limited  Radiation dose length product (DLP) for this visit:  1601 mGy-cm     This examination, like all CT scans performed in the [facility], was performed utilizing techniques to minimize radiation dose exposure, including the use of iterative reconstruction and automated exposure control  URINARY TRACT FINDINGS: RIGHT KIDNEY AND URETER:  2 mm calyceal calculus lower pole (series 2, image 40; series 3, image 120)  No additional renal calyceal calculi are identified  There is mild hydronephrosis and moderate hydroureter, up to the level of the urinary bladder  Mild perinephric and periureteric inflammation, up to the level of the urinary bladder  No ureteric calculi are seen  Stable low-density renal lesions, most compatible with cysts  LEFT KIDNEY AND URETER:  5 mm calyceal calculus anterior interpolar region (series 2, image 33; series 3, image 123)  Moderate hydroureteronephrosis, up to the level of a 5 x 3 x 4 mm calculus in the mid ureter (series 2, image 47; series 3, image 125; series 4, image 84)  This is located at the level of the superior endplate of the L4 vertebral body  This has migrated from the lower pole of the left kidney  Moderate perinephric and periureteric inflammation, up to the level of the calculus  No perinephric fluid collections are present to suggest an abscess  There is mild hydroureter beyond the calculus, up to the urinary bladder  No distal ureteric calculi are seen  There are stable subcentimeter hyperdense lesions in the kidney, too small to accurately characterize, most compatible with high proteinaceous cysts  URINARY BLADDER:  The bladder is decompressed and inadequately evaluated  There are circumferential, irregular bladder wall thickening, new from the prior study  ADDITIONAL FINDINGS: LOWER CHEST:  No clinically significant abnormality identified in the visualized lower chest  SOLID VISCERA: Limited low radiation dose noncontrast CT evaluation demonstrates no clinically significant abnormality of the imaged portions of the spleen, pancreas, or adrenal glands  The liver is enlarged with fatty infiltrative changes  GALLBLADDER/BILIARY TREE:  No calcified gallstones   No pericholecystic inflammatory change  No biliary dilatation  STOMACH AND BOWEL:  Evaluation of the GI tract is limited due to lack of oral contrast material  Stomach incompletely distended with ingested food products and air  Hiatal hernia  No evidence of small bowel obstruction  The colon is segmentally distended with feces  Normal fecal burden in the colon  Scattered diverticula throughout the entire colon  This is most pronounced in the sigmoid colon  Sigmoid colon redundant and tortuous  Nothing to suggest acute diverticulitis  APPENDIX:  No findings to suggest appendicitis  ABDOMINOPELVIC CAVITY:  No ascites  No pneumoperitoneum  No lymphadenopathy  REPRODUCTIVE ORGANS:  The prostate gland is enlarged and heterogeneous in CT density  Scattered prostatic calcifications  Mild periprosthetic inflammation  ABDOMINAL WALL/INGUINAL REGIONS:  Mild diastases of the rectus abdominis musculature  Umbilical hernia containing fat  Small left inguinal hernia containing fat  OSSEOUS STRUCTURES:  No acute fracture or destructive osseous lesion  Spinal degenerative changes are noted  This is most pronounced L1-L2 and L5-S1  Grade 1 retrolisthesis L5 on S1  Mild degenerative changes right hip  Severe degenerative changes of the left hip  Foreshortening of the left femoral neck  Flattening of the left femoral head  Mild lateral subluxation of the left femoral head  Subchondral sclerosis and subchondral cystic changes in the superior, weight  bearing portion of the left femoral head and left acetabulum  Dystrophic calcifications  Impression: 1  Moderately obstructing 5 x 4 x 3 mm mid left ureteric calculus located at the level of the superior endplate of the L4 vertebral body  This has migrated from the lower pole of the left kidney  Follow-up urology consultation recommended  2   Additional nonobstructing bilateral renal calyceal calculi  Follow-up urology consultation recommended   3   Abnormal appearance of the urinary bladder, new from the prior study  There is moderate right-sided hydroureter and mild left-sided hydroureter without obstructing distal ureteric calculi  Correlate for ureteral obstruction secondary to cystitis versus bladder outlet obstruction from prostatic enlargement  Follow-up urology consultation recommended  4   Mild abnormal appearance of the prostate gland  Correlate for acute prostatitis  Follow-up urology consultation recommended  5   Severe degenerative changes of the left hip  If warranted, consider follow-up orthopedic surgery evaluation  The study was marked in [facility] for immediate notification  Workstation performed: [id number]               Thank you for consulting the hospitalist service for assistance in medical management of your patient  We will continue to follow through the course of the hospital stay  Seen By:  Marine Goldberg MD          ** Please Note: This note is constructed using a voice recognition dictation system  An occasional wrong word/phrase or sound-a-like substitution may have been picked up by dictation device due to the inherent limitations of voice recognition software  Read the chart carefully and recognize, using reasonable context, where substitutions may have occurred  **

## 2022-08-09 NOTE — UTILIZATION REVIEW
Initial Clinical Review    Admission: Date/Time/Statement:   Admission Orders (From admission, onward)     Ordered        08/08/22 1833  INPATIENT ADMISSION  Once                      Orders Placed This Encounter   Procedures    INPATIENT ADMISSION     Standing Status:   Standing     Number of Occurrences:   1     Order Specific Question:   Level of Care     Answer:   Med Surg [16]     Order Specific Question:   Estimated length of stay     Answer:   More than 2 Midnights     Order Specific Question:   Certification     Answer:   I certify that inpatient services are medically necessary for this patient for a duration of greater than two midnights  See H&P and MD Progress Notes for additional information about the patient's course of treatment  ED Arrival Information     Expected   -    Arrival   8/8/2022 16:39    Acuity   Urgent            Means of arrival   Wheelchair    Escorted by   Self    Service   Hospitalist    Admission type   Urgent            Arrival complaint   Surgery Admission           Chief Complaint   Patient presents with    Flank Pain     Pt was sent over by Dr Lawson Monroe for admission due to right sided kidney stone  Pt is due to have surgery today  Pt has been having right flank pain for one week  Pt was initially told to get medications by urology and be seen in the office tomorrow but redirected for admission and surgery  Pt is also nauseated and dizzy  Initial Presentation: 56 y o  male with PMH of gross hematuria, DM, elevated PSA, HTN, gout, renal disorder presents to [facility] via personal vehicle with c/o blood in urine and flank pain  Pt called urologist office, CT scan on 8/6 revealed mild right hydroureteronephrosis and a proximal left ureteral stone with upstream hydro  In ED, temp 101 3, UA and cx positive for UTI  Started on IV ABX  Admit Inpatient med surg d/t Obstructing left ureterals tone, UTI and fever:  Urology consulted, plan for BL stents, keep NPO  Continue IV ABX, order labs and f/u on cxs  Continue to monitor VS, continue IVF, pain and nausea control, monitor renal function, low Na diet, start accuchecks w/ssi, continue home meds  8/8 OP Note:  Operation:     Cystoscopy  Bilateral Retrograde Pyelogram   Bilateral ureteral stent placement (6Fr x 26 cm JJ stent)  Fluoroscopic Guidance  Lopez Catheter placement       Anesthesia:  General   Drains:  18Fr Coude lopez catheter, 6Fr x 26 cm JJ stent  Estimated Blood Loss:  Minimal   Urine Output:  N/A   Specimens: Urine culture    Date: 8/9   Day 2:   Pt states of fever overnight, c/o mild tenderness from stents and mild spasma with ureteral stents and lopez catheter  Lopez draining clear yellow urine  Continue w/ IVF and IV ABX  Cr trending down today (baseline 1 4), continue to monitor  Maintain Lopez until pt has afebrile for 24 hrs  Continue accuchecks w/ssi, continue home meds      ED Triage Vitals   Temperature Pulse Respirations Blood Pressure SpO2   08/08/22 1702 08/08/22 1702 08/08/22 1702 08/08/22 1702 08/08/22 1702   (!) 101 3 °F (38 5 °C) 94 20 113/79 96 %      Temp Source Heart Rate Source Patient Position - Orthostatic VS BP Location FiO2 (%)   08/08/22 1702 08/08/22 1702 -- -- 08/08/22 1935   Tympanic Monitor   100      Pain Score       08/08/22 1702       4          Wt Readings from Last 1 Encounters:   08/09/22 122 kg (270 lb)     Additional Vital Signs:   Date/Time Temp Pulse Resp BP MAP (mmHg) SpO2 FiO2 (%) Calculated FIO2 (%) - Nasal Cannula O2 Flow Rate (L/min) Nasal Cannula O2 Flow Rate (L/min) O2 Device Cardiac (WDL)   08/09/22 07:37:29 97 3 °F (36 3 °C) Abnormal  62 -- 120/77 91 92 % -- -- -- -- -- --   08/09/22 0550 -- -- -- -- -- -- -- -- -- -- None (Room air) --   08/08/22 23:04:12 99 2 °F (37 3 °C) 71 -- 115/68 84 96 % -- -- -- -- -- --   08/08/22 21:56:19 100 9 °F (38 3 °C) Abnormal  85 -- 119/70 86 94 % -- -- -- -- -- --   08/08/22 20:59:14 99 3 °F (37 4 °C) 90 16 106/70 82 93 % -- -- -- -- -- --   08/08/22 2045 -- -- -- -- -- -- -- -- -- -- None (Room air) --   08/08/22 2015 98 9 °F (37 2 °C) 90 16 110/57 72 94 % -- 28 -- 2 L/min Nasal cannula WDL   08/08/22 2000 -- 96 13 102/74 82 96 % -- -- -- -- -- --   08/08/22 1945 -- 100 15 102/74 82 97 % -- -- -- -- -- --   08/08/22 1935 99 3 °F (37 4 °C) 106 Abnormal  21 140/77 -- 97 % 100 -- 6 L/min -- Simple mask --   08/08/22 1930 99 3 °F (37 4 °C) 109 Abnormal  16 140/77 98 96 % -- -- 6 L/min -- Simple mask X   08/08/22 1702 101 3 °F (38 5 °C) Abnormal  94 20 113/79 -- 96 % -- -- -- -- -- --       Pertinent Labs/Diagnostic Test Results:   FL retrograde pyelogram    (Results Pending)     Results from last 7 days   Lab Units 08/08/22 1933   SARS-COV-2  Negative     Results from last 7 days   Lab Units 08/09/22 0612 08/08/22  1750   WBC Thousand/uL 15 13* 14 88*   HEMOGLOBIN g/dL 14 7 15 7   HEMATOCRIT % 44 4 47 9   PLATELETS Thousands/uL 225 264   NEUTROS ABS Thousands/µL 13 40* 11 95*     Results from last 7 days   Lab Units 08/09/22 0612 08/08/22  1750   SODIUM mmol/L 137 132*   POTASSIUM mmol/L 4 3 3 8   CHLORIDE mmol/L 105 100   CO2 mmol/L 25 25   ANION GAP mmol/L 7 7   BUN mg/dL 35* 31*   CREATININE mg/dL 2 38* 2 56*   EGFR ml/min/1 73sq m 29 27   CALCIUM mg/dL 9 6 9 5     Results from last 7 days   Lab Units 08/08/22  1750   AST U/L 27   ALT U/L 35   ALK PHOS U/L 122*   TOTAL PROTEIN g/dL 8 8*   ALBUMIN g/dL 2 9*   TOTAL BILIRUBIN mg/dL 1 20*     Results from last 7 days   Lab Units 08/09/22  0612 08/08/22 2051 08/08/22 1958   POC GLUCOSE mg/dl 240* 152* 146*     Results from last 7 days   Lab Units 08/09/22  0612 08/08/22  1750   GLUCOSE RANDOM mg/dL 241* 171*     Results from last 7 days   Lab Units 08/08/22  1750   PROTIME seconds 14 5   INR  1 11   PTT seconds 32     Results from last 7 days   Lab Units 08/09/22  0612 08/08/22  1750   PROCALCITONIN ng/ml 5 55* 8 30*     Results from last 7 days   Lab Units 08/09/22  0612 08/08/22  2155 08/08/22  1750   LACTIC ACID mmol/L 1 3 0 9 2 6*     Results from last 7 days   Lab Units 08/08/22  1750   LIPASE u/L 305     Results from last 7 days   Lab Units 08/08/22  1735 08/06/22  0955   CLARITY UA  Cloudy Cloudy*   COLOR UA  Orange Yellow   SPEC GRAV UA  1 015 1 015   PH UA  5 5 6 0   GLUCOSE UA mg/dl 500 (1/2%)* >=1000 (1%)*   KETONES UA mg/dl Negative 5 (Trace)*   BLOOD UA  Moderate* 250 0*   PROTEIN UA mg/dl >=300* >=500*   NITRITE UA  Negative Negative   BILIRUBIN UA  Negative Negative   UROBILINOGEN UA E U /dl 0 2 1 0   LEUKOCYTES UA  Small* 500 0*   WBC UA /hpf Innumerable* Innumerable*   RBC UA /hpf 4-10* 20-30*   BACTERIA UA /hpf Occasional Innumerable*   EPITHELIAL CELLS WET PREP /hpf Occasional Occasional     Results from last 7 days   Lab Units 08/08/22  1750 08/06/22  0955   BLOOD CULTURE  Received in Microbiology Lab  Culture in Progress  Received in Microbiology Lab  Culture in Progress    --    URINE CULTURE   --  >100,000 cfu/ml Escherichia coli*     ED Treatment:   Medication Administration from 08/08/2022 1639 to 08/08/2022 1755       Date/Time Order Dose Route Action     08/08/2022 1755 cefTRIAXone (ROCEPHIN) 2,000 mg in dextrose 5 % 50 mL IVPB   Intravenous MAR Hold        Past Medical History:   Diagnosis Date    Diabetes mellitus ([address] )     Elevated PSA     Gout     Hypertension     Renal disorder      Present on Admission:   Obstructive pyelonephritis      Admitting Diagnosis: Flank pain [R10 9]  Age/Sex: 56 y o  male  Admission Orders:  Scheduled Medications:  amLODIPine, 10 mg, Oral, Daily  cefepime, 2,000 mg, Intravenous, Q12H  heparin (porcine), 5,000 Units, Subcutaneous, Q8H ERIS  insulin lispro, 2-12 Units, Subcutaneous, 4x Daily (AC & HS)  tamsulosin, 0 4 mg, Oral, Daily With Dinner      Continuous IV Infusions:  lactated ringers, 125 mL/hr, Intravenous, Continuous      PRN Meds:  acetaminophen, 650 mg, Oral, Q6H PRN  hydrALAZINE, 5 mg, Intravenous, Q6H PRN  HYDROmorphone, 0 2 mg, Intravenous, Q3H PRN  ondansetron, 4 mg, Intravenous, Q4H PRN  oxybutynin, 5 mg, Oral, TID PRN  oxyCODONE, 5 mg, Oral, Q4H PRN x2 thus far    [address] TO INTERNAL MEDICINE    Network Utilization Review Department  ATTENTION: Please call with any questions or concerns to [phone] and carefully listen to the prompts so that you are directed to the right person  All voicemails are confidential   Ty Limb all requests for admission clinical reviews, approved or denied determinations and any other requests to dedicated fax number below belonging to the campus where the patient is receiving treatment   List of dedicated fax numbers for the Facilities:  [address] DENIALS (Administrative/Medical Necessity) [phone]   [address] (Maternity/NICU/Pediatrics) [phone]   [address]  [address] [facility] [place] [phone] [address] [phone]

## 2022-08-09 NOTE — PROGRESS NOTES
Progress Note - urology  Victor M León 56 y o  male MRN: [id number]  Unit/Bed#: [facility] 821-01 Encounter: [id number]    Assessment & Plan:    Bilateral hydronephrosis with sepsis:  -CT scan on admission revealed moderately obstructing 5 x 4 x 3 mm mid left ureteral calculus located at the level of the superior endplate of the L4 vertebrae in body  Additional nonobstructing bilateral renal calyceal calculi  Abnormal appearance urinary bladder new from prior study  There is moderate right-sided hydroureteronephrosis left hydroureter without obstructing distal ureteral calculi  Correlate for ureteral obstruction secondary to cystitis versus bladder outlet obstruction from prostatic enlargement  Mild abnormal appearance of prostate gland correlate for acute prostatitis  -status post cystoscopy bilateral retrograde pyelogram and bilateral ureteral stent insertion  -normal urethral trauma trilobar prostatic enlargement, orthotropic ureteral orifice ease with marked overlying erythema of the trigone and base the bladder, diffuse cystitis changes in the bladder  Purulence from bilateral right and left kidneys upon placement of wires  -urine culture from 08/06 positive for E coli, pansensitive, additional urine culture from intra currently pending, blood cultures obtained coming back positive for Gram-negative rods  -continue with medical optimization with IV fluids and antibiotics, will adjust antibiotics based off cultures  Medicine currently consulted, appreciate their recommendations and assistance in managing this patient from a medical standpoint   -he can admission 2 56, currently slowly trending downward 2 3 today, baseline 1 4-1 5 last year  Will continue to trend downward  Most are and currently on hold  -lactic acidosis resolved  -leukocytosis of 15  -urethral Olmedo catheter in place, recommend maintaining until patient has been afebrile for 24 hours      Type 2 diabetes mellitus:  -hemoglobin A1c 5 7  -sliding scale insulin,  -Medicine assisting with management  Essential hypertension:  -Norvasc and losartan, Norvasc continued, losartan on hold due to BURTON  -Medicine assisting with management      Subjective/Objective   Chief Complaint:  Spasms    Subjective:   Patient currently sitting comfortably in bed no acute distress  Denies any current fevers or chills  Reports he was having fevers overnight  Denies any nausea or vomiting  Denies any current significant pain  Reports he has some mild tenderness post mostly from the stents which is tolerable  Reports he has some mild spasms with ureteral stents and urethral Olmedo catheter  Objective:     Blood pressure 120/77, pulse 62, temperature (!) 97 3 °F (36 3 °C), resp  rate 16, height 5' 8" (1 727 m), weight 122 kg (270 lb), SpO2 92 %  ,Body mass index is 41 05 kg/m²  Intake/Output Summary (Last 24 hours) at 8/9/2022 1229  Last data filed at 8/9/2022 1101  Gross per 24 hour   Intake 1981 67 ml   Output 2350 ml   Net -368  33 ml       Invasive Devices  Report    Peripheral Intravenous Line  Duration           Peripheral IV 08/09/22 Left;Ventral (anterior) Forearm <1 day          Drain  Duration           Urethral Catheter Coude 18 Fr  <1 day              Physical Exam  Constitutional:       General: He is not in acute distress  Appearance: He is obese  He is not toxic-appearing or diaphoretic  HENT:      Head: Normocephalic and atraumatic  Right Ear: External ear normal       Left Ear: External ear normal       Mouth/Throat:      Pharynx: Oropharynx is clear  Eyes:      Conjunctiva/sclera: Conjunctivae normal    Cardiovascular:      Rate and Rhythm: Normal rate and regular rhythm  Pulses: Normal pulses  Heart sounds: No murmur heard  No friction rub  No gallop  Pulmonary:      Effort: Pulmonary effort is normal  No respiratory distress  Breath sounds: No wheezing, rhonchi or rales     Abdominal:      General: Bowel sounds are normal  There is no distension  Tenderness: There is no abdominal tenderness  There is no right CVA tenderness or left CVA tenderness  Genitourinary:     Comments: Urethral Olmedo catheter in place draining clear yellow urine  Musculoskeletal:         General: Normal range of motion  Skin:     General: Skin is warm and dry  Neurological:      General: No focal deficit present  Mental Status: He is alert and oriented to person, place, and time  Psychiatric:         Mood and Affect: Mood normal          Behavior: Behavior normal          Thought Content: Thought content normal          Judgment: Judgment normal          Lab, Imaging and other studies:I have personally reviewed pertinent lab results      Lab Results   Component Value Date    WBC 15 13 (H) 08/09/2022    HGB 14 7 08/09/2022    HCT 44 4 08/09/2022    MCV 89 08/09/2022     08/09/2022     Lab Results   Component Value Date    SODIUM 137 08/09/2022    K 4 3 08/09/2022     08/09/2022    CO2 25 08/09/2022    BUN 35 (H) 08/09/2022    CREATININE 2 38 (H) 08/09/2022    GLUC 241 (H) 08/09/2022    CALCIUM 9 6 08/09/2022       VTE Pharmacologic Prophylaxis: Heparin  VTE Mechanical Prophylaxis: sequential compression device      Dina Cano PA-C

## 2022-08-09 NOTE — ASSESSMENT & PLAN NOTE
Presents with a high-grade fever coupled w/ tachycardia/leukocytosis, along with lactic acidosis and elevated procalcitonin (trend until peak)  In the setting of obstructive pyelonephritis (see below) -> urine cultures from before grew E Coli, BC 1/2 growing EColi too  Change IV Abx from cefepime to IV rocephin  Monitor vitals and maintain hemodynamics - continue IV fluids  Supportive care otherwise

## 2022-08-09 NOTE — PROGRESS NOTES
[facility]  Progress Note - Juan Garcia 1965, 56 y o  male MRN: [id number]  Unit/Bed#: [facility] 821-01 Encounter: [id number]  Primary Care Provider: Alan Looney MD   Date and time admitted to hospital: 8/8/2022  5:23 PM    * Sepsis on admission  Assessment & Plan  Presents with a high-grade fever coupled w/ tachycardia/leukocytosis, along with lactic acidosis and elevated procalcitonin (trend until peak)  In the setting of obstructive pyelonephritis (see below) -> urine cultures from before grew E Coli, BC 1/2 growing EColi too  Change IV Abx from cefepime to IV rocephin  Monitor vitals and maintain hemodynamics - continue IV fluids  Supportive care otherwise    Hyponatremia  Assessment & Plan  Mildly diminished at 132 - trialed on IV fluids in the setting of severe sepsis  resolved    Acute kidney injury superimposed on chronic kidney disease stage 3   Assessment & Plan  Acute worsening in the setting of obstructive pyelonephritis  Baseline creatinine approximate 1 4-1 5 over the last year @ [facility] - presented @ 2 56 -> now 2 3  Anticipate improvement w/ management of sepsis and genitourinary decompression/stenting   Continue IV fluids - monitor renal function and urine output - limit/avoid nephrotoxins possible - avoid hypotension as possible - holding Cozaar    Morbid obesity  Assessment & Plan  BMI of 41 21  Lifestyle/diet modifications    Diabetes mellitus type 2   Assessment & Plan  Lab Results   Component Value Date    HGBA1C 5 7 02/10/2022     Hold oral hypoglycemics while hospitalized  Initiate SSI coverage per Accu-Cheks  Aggressive blood sugar control in the setting of sepsis  Carbohydrate restricted diet    Essential hypertension  Assessment & Plan  Optimize pain control  Continue Norvasc - held Cozaar preoperatively and in the setting of acute kidney injury (if renal function stable, can resume over the next few days) - PRN IV Hydralazine on board for BP spikes  Low-sodium diet    Obstructive pyelonephritis  Assessment & Plan  CT imaging on 8/6 revealed: "Moderately obstructing 5 x 4 x 3 mm mid left ureteric calculus located at the level of the superior endplate of the L4 vertebral body  This has migrated from the lower pole of the left kidney  Pedro Simple Pedro Simple Additional nonobstructing bilateral renal calyceal calculi  [place] Simple Pedro Simple Abnormal appearance of the urinary bladder, new from the prior study  There is moderate right-sided hydroureter and mild left-sided hydroureter without obstructing distal ureteric calculi  Correlate for ureteral obstruction secondary to cystitis versus bladder outlet obstruction from prostatic enlargement  Pedro Simple [place] Simple Mild abnormal appearance of the prostate gland  Correlate for acute prostatitis "  Urgently taken to the OR from the ED w/ plan for cystoscopy with retrograde pyelogram and bilateral ureteral stenting in the setting of sepsis  Continue IV fluids and IV Abx  PRN pain/emesis control  Monitor renal function  Further management and postoperative recommendations per primary service (urology) - initiated on Flomax        VTE Pharmacologic Prophylaxis:   Moderate Risk (Score 3-4) - Pharmacological DVT Prophylaxis Ordered: heparin  Patient Centered Rounds: I performed bedside rounds with nursing staff today  Discussions with Specialists or Other Care Team Provider:     Education and Discussions with Family / Patient: patient  Time Spent for Care: 30 minutes  More than 50% of total time spent on counseling and coordination of care as described above  Current Length of Stay: 1 day(s)  Current Patient Status: Inpatient   Certification Statement: The patient will continue to require additional inpatient hospital stay due to montor temp, Cr, IV Abx, IVF  Discharge Plan: Anticipate discharge in 24-48 hrs to home      Code Status: Level 1 - Full Code    Subjective:   C/o upper & left side abdo pain, no fever or N/V    Objective:     Vitals:   Temp (24hrs), Av 4 °F (37 4 °C), Min:97 3 °F (36 3 °C), Max:101 3 °F (38 5 °C)    Temp:  [97 3 °F (36 3 °C)-101 3 °F (38 5 °C)] 97 3 °F (36 3 °C)  HR:  [] 66  Resp:  [13-21] 20  BP: (102-140)/(57-79) 130/79  SpO2:  [92 %-97 %] 94 %  Body mass index is 41 05 kg/m²  Input and Output Summary (last 24 hours): Intake/Output Summary (Last 24 hours) at 2022 1645  Last data filed at 2022 1505  Gross per 24 hour   Intake 3463 34 ml   Output 2900 ml   Net 563 34 ml       Physical Exam:   Physical Exam  Vitals reviewed  HENT:      Head: Normocephalic and atraumatic  Mouth/Throat:      Mouth: Mucous membranes are moist    Cardiovascular:      Rate and Rhythm: Normal rate and regular rhythm  Heart sounds: Normal heart sounds  Pulmonary:      Effort: Pulmonary effort is normal  No respiratory distress  Breath sounds: Normal breath sounds  No wheezing  Abdominal:      General: There is no distension  Palpations: Abdomen is soft  Tenderness: There is no abdominal tenderness  Musculoskeletal:      Right lower leg: No edema  Left lower leg: No edema  Neurological:      Mental Status: He is alert and oriented to person, place, and time            Additional Data:     Labs:  Results from last 7 days   Lab Units 22  0612   WBC Thousand/uL 15 13*   HEMOGLOBIN g/dL 14 7   HEMATOCRIT % 44 4   PLATELETS Thousands/uL 225   NEUTROS PCT % 88*   LYMPHS PCT % 7*   MONOS PCT % 4   EOS PCT % 0     Results from last 7 days   Lab Units 22  0612 22  1750   SODIUM mmol/L 137 132*   POTASSIUM mmol/L 4 3 3 8   CHLORIDE mmol/L 105 100   CO2 mmol/L 25 25   BUN mg/dL 35* 31*   CREATININE mg/dL 2 38* 2 56*   ANION GAP mmol/L 7 7   CALCIUM mg/dL 9 6 9 5   ALBUMIN g/dL  --  2 9*   TOTAL BILIRUBIN mg/dL  --  1 20*   ALK PHOS U/L  --  122*   ALT U/L  --  35   AST U/L  --  27   GLUCOSE RANDOM mg/dL 241* 171*     Results from last 7 days   Lab Units 22  1750   INR  1 11     Results from last 7 days   Lab Units 08/09/22  1557 08/09/22  1100 08/09/22  0612 08/08/22  2051 08/08/22  1958   POC GLUCOSE mg/dl 155* 230* 240* 152* 146*         Results from last 7 days   Lab Units 08/09/22  0612 08/08/22  2155 08/08/22  1750   LACTIC ACID mmol/L 1 3 0 9 2 6*   PROCALCITONIN ng/ml 5 55*  --  8 30*       Lines/Drains:  Invasive Devices  Report    Peripheral Intravenous Line  Duration           Peripheral IV 08/09/22 Left;Ventral (anterior) Forearm <1 day          Drain  Duration           Urethral Catheter Coude 18 Fr  <1 day              Urinary Catheter:  Goal for removal: per urology               Imaging: No pertinent imaging reviewed  Recent Cultures (last 7 days):   Results from last 7 days   Lab Units 08/08/22  1750 08/06/22  0955   BLOOD CULTURE  Received in Microbiology Lab  Culture in Progress    --    GRAM STAIN RESULT  Gram negative rods*  --    URINE CULTURE   --  >100,000 cfu/ml Escherichia coli*       Last 24 Hours Medication List:   Current Facility-Administered Medications   Medication Dose Route Frequency Provider Last Rate    acetaminophen  650 mg Oral Q6H PRN Danay Mccray MD      amLODIPine  10 mg Oral Daily Danay Mccray MD      cefTRIAXone  2,000 mg Intravenous Q24H Reed Fairchild MD      heparin (porcine)  5,000 Units Subcutaneous [facility] Trae Mayes MD      hydrALAZINE  5 mg Intravenous Q6H PRN Danay Mccray MD      HYDROmorphone  0 2 mg Intravenous Q3H PRN Trae Mayes MD      insulin lispro  2-12 Units Subcutaneous 4x Daily (AC & HS) Danay Mccray MD      ondansetron  4 mg Intravenous Q4H PRN Danay Mccray MD      oxybutynin  5 mg Oral TID PRN Trae Mayes MD      oxyCODONE  5 mg Oral Q4H PRN Trae Mayes MD      sodium chloride  100 mL/hr Intravenous Continuous Reed Fairchild MD      tamsulosin  0 4 mg Oral Daily With Amy Persaud MD          Today, Patient Was Seen By: Reed Fairchild MD    **Please Note: This note may have been constructed using a voice recognition system  **

## 2022-08-09 NOTE — ASSESSMENT & PLAN NOTE
Mildly diminished at 132 - trialed on IV fluids in the setting of severe sepsis  Monitor serum sodium

## 2022-08-09 NOTE — CASE MANAGEMENT
Case Management Discharge Planning Note    Patient name Victor M Edwardsnce  [facility]N [id number]  : 1965 Date 2022       Current Admission Date: 2022  Current Admission Diagnosis:Sepsis on admission   Patient Active Problem List    Diagnosis Date Noted    Obstructive pyelonephritis 2022    Sepsis on admission 2022    Essential hypertension 2022    Diabetes mellitus type 2  2022    Morbid obesity 2022    Acute kidney injury superimposed on chronic kidney disease stage 3  2022    Hyponatremia 2022    Gross hematuria 2022    Elevated PSA     Renal cyst     Nephrolithiasis     Abnormal urine cytology     Uncontrolled type 2 diabetes mellitus with hyperglycemia ([facility] [address] ) 2022      LOS (days): 1  Geometric Mean LOS (GMLOS) (days):   Days to GMLOS:     OBJECTIVE:  Risk of Unplanned Readmission Score: 10 52         Current admission status: Inpatient   Preferred Pharmacy:   [address] [place] [address] [id number] [facility]  [facility] [address]  [id number] [facility] [address]  Phone: [phone] Fax: [phone]    Primary Care Provider: Dai Avalos MD    Primary Insurance: Demarco Karimi  Secondary Insurance:     DISCHARGE DETAILS:    Whitney Anne CM, avail for DCP [phone]

## 2022-08-09 NOTE — OP NOTE
OPERATIVE REPORT     Name: Alexandra Cortez     MRN: [id number]  Date: 8/8/2022 Time: 10:20 PM     Location:       [facility]   Date of Operation:  8/8/2022   Service: Urology     Pre-op Diagnosis:  Left ureteral calculus, right hydroureteronephrosis    Post-op Diagnosis:  Same     Operation:     Cystoscopy  Bilateral Retrograde Pyelogram   Bilateral ureteral stent placement (6Fr x 26 cm JJ stent)  Fluoroscopic Guidance  Lopez Catheter placement      Surgeon:  Katiana Martinez MD  Assistants:  None      Anesthesia:  General   Drains:  18Fr Coude lopez catheter, 6Fr x 26 cm JJ stent  Estimated Blood Loss:  Minimal   Urine Output:  N/A   Specimens: Urine culture  Apparent Intraoperative Complications:  None   Patient Condition:  Stable   Disposition:  PACU  Antibiotic Prophylaxis:  Cefepime 2 grams     Indications:     56 y o  male with a left ureteral stones, right hydroureteronephrosis, fever and UTI presenting for bilateral stent placement  Risks, benefits and alternatives to treatment were discussed with the patient who elected to proceed with the operation  Findings:    - normal urethra   - trilobar prostatic enlargement, coapting lateral lobes   - orthotopic ureteral orifices with marked overlying erythema on the trigone and base of the bladder  - diffuse cystitis changes in the bladder  - Purulence from bilateral right > left kidneys upon placement of the wire  Operative Report:    The patient was identified, and the procedure verified  After induction of anesthesia the patient was prepped and draped in the dorsolithotomy position  ? A  film was obtained  A 22 5 Fr rigid cystoscope was passed per urethra to the bladder revealing the above findings  Placing a wire using the 30 degree scope was difficult so I switched to the 70 degree lens with the Alberann bridge  A SoloPlus wire was than used to intubate the left ureteral orifice and was passed up to the renal pelvis under fluoroscopic guidance   A 5Fr open ended ureteral catheter was inserted over the wire and passed up to the renal pelvis  ? The wire was removed and a retrograde pyelogram obtained  ? The wire was replaced and passed up to the kidney under fluoroscopic guidance  ? A 6Fr ?x 26cm? JJ stent was placed in standard retrograde fashion under direct vision with fluoroscopic guidance  ? Upon removal of the wire an adequate curl was noted in the renal pelvis and the bladder on fluoroscopy  A similar procedure was performed on the right side  A urine sample was collected for culture  The bladder was left partially full  An 18Fr Coude lopez catheter was placed  The patient was awoken from anesthesia?and transferred to PACU in satisfactory condition  ? Left retrograde pyelogram interpretation: normal course of the ureter, no filling defects, smooth contours of the renal pelvis, sharp calyces, moderate hydronephrosis    Right retrograde pyelogram interpretation: normal course of the ureter, normal caliber, no filling defects, smooth contours of the renal pelvis, sharp calyces  I was present for the entire procedure     Plan:    - Admit to urology with medicine consult  - maintain lopez catheter until afebrile 24 hours  - broad spectrum abx, tailor to cultures   - definitve stone surgery is already scheduled for 9/16/2022  Patient will need repeat urine culture closer to and prior to surgery      SIGNATURE: Rahul Villarreal MD  DATE: 8/8/2022  TIME: 10:20 PM

## 2022-08-09 NOTE — ASSESSMENT & PLAN NOTE
Presents with a high-grade fever coupled w/ tachycardia/leukocytosis, along with lactic acidosis and elevated procalcitonin (trend until peak)  In the setting of obstructive pyelonephritis (see below) -> await blood cultures and urine culture (w/ severity of sepsis criteria may possibly have underlying bacteremia)  Monitor vitals and maintain hemodynamics - continue IV fluids  Supportive care otherwise

## 2022-08-09 NOTE — ASSESSMENT & PLAN NOTE
CT imaging on 8/6 revealed: "Moderately obstructing 5 x 4 x 3 mm mid left ureteric calculus located at the level of the superior endplate of the L4 vertebral body  This has migrated from the lower pole of the left kidney  Romy Ramirez Additional nonobstructing bilateral renal calyceal calculi  Romy Ramirez Abnormal appearance of the urinary bladder, new from the prior study  There is moderate right-sided hydroureter and mild left-sided hydroureter without obstructing distal ureteric calculi  Correlate for ureteral obstruction secondary to cystitis versus bladder outlet obstruction from prostatic enlargement  Romy Ramirez Mild abnormal appearance of the prostate gland    Correlate for acute prostatitis "  Urgently taken to the OR from the ED w/ plan for cystoscopy with retrograde pyelogram and bilateral ureteral stenting in the setting of sepsis  Continue IV fluids and IV Abx  PRN pain/emesis control  Monitor renal function  Further management and postoperative recommendations per primary service (urology) - initiated on Flomax

## 2022-08-09 NOTE — CASE MANAGEMENT
Case Management Assessment & Discharge Planning Note    Patient name Freda Vega  Location [facility] 821/[facility] 385-85 MRN [id number]  : 1965 Date 2022       Current Admission Date: 2022  Current Admission Diagnosis:Sepsis on admission   Patient Active Problem List    Diagnosis Date Noted    Obstructive pyelonephritis 2022    Sepsis on admission 2022    Essential hypertension 2022    Diabetes mellitus type 2  2022    Morbid obesity 2022    Acute kidney injury superimposed on chronic kidney disease stage 3  2022    Hyponatremia 2022    Gross hematuria 2022    Elevated PSA     Renal cyst     Nephrolithiasis     Abnormal urine cytology     Uncontrolled type 2 diabetes mellitus with hyperglycemia ([facility] [address] ) 2022      LOS (days): 1  Geometric Mean LOS (GMLOS) (days):   Days to GMLOS:     OBJECTIVE:    Risk of Unplanned Readmission Score: 10 52         Current admission status: Inpatient       Preferred Pharmacy:   [address] [place] [address] #[id number] [address]  Phone: [phone] Fax: [phone]    Primary Care Provider: Jayshree Guevara MD    Primary Insurance: Gauri King Insurance:     ASSESSMENT:  [place]  Proxies    There are no active Health Care Proxies on file  Advance Directives  Does patient have a [address] [place] [address] Avenue?: No  Does patient have Advance Directives?: No              Patient Information  Admitted from[de-identified] Home  Mental Status: Alert  During Assessment patient was accompanied by: Not accompanied during assessment  Assessment information provided by[de-identified] Patient  Primary Caregiver: Self  Support Systems: Son  Home entry access options   Select all that apply : Stairs  Number of steps to enter home : 4  Do the steps have railings?: Yes  Type of Current Residence: Apartment  Floor Level: 1  Upon entering residence, is there a bedroom on the main floor (no further steps)?: Yes  Upon entering residence, is there a bathroom on the main floor (no further steps)?: Yes  In the last 12 months, was there a time when you were not able to pay the mortgage or rent on time?: No  In the last 12 months, how many places have you lived?: 1  In the last 12 months, was there a time when you did not have a steady place to sleep or slept in a shelter (including now)?: No  Homeless/housing insecurity resource given?: N/A  Living Arrangements: Lives Alone    Activities of Daily Living Prior to Admission  Functional Status: Independent  Completes ADLs independently?: Yes  Ambulates independently?: Yes  Does patient use assisted devices?: No  Does patient currently own DME?: No  Does patient have a history of Outpatient Therapy (PT/OT)?: No  Does the patient have a history of Short-Term Rehab?: No  Does patient have a history of HHC?: No         Patient Information Continued  Income Source: Employed  Does patient have prescription coverage?: Yes  Within the past 12 months, you worried that your food would run out before you got the money to buy more : Never true  Within the past 12 months, the food you bought just didn't last and you didn't have money to get more : Never true  Food insecurity resource given?: N/A  Does patient receive dialysis treatments?: No  Does patient have a history of substance abuse?: No  Does patient have a history of Mental Health Diagnosis?: No         Means of Transportation  Means of Transport to Appts[de-identified] Drives Self  In the past 12 months, has lack of transportation kept you from medical appointments or from getting medications?: No  In the past 12 months, has lack of transportation kept you from meetings, work, or from getting things needed for daily living?: No  Was application for public transport provided?: N/A        DISCHARGE DETAILS:    Discharge planning discussed with[de-identified] patient  Freedom of Choice: Yes                   Contacts  Patient Contacts: son Radha Falling  Relationship to Patient[de-identified] Family  Contact Method: Phone  Phone Number: [phone]  Reason/Outcome: Continuity of Care, Emergency Contact, Discharge Planning                   Would you like to participate in our [address] service program?  : No - Declined    Treatment Team Recommendation: Home     CM reviewed d/c planning process including the following: identifying help at home, patient preference for d/c planning needs, Discharge Lounge, [facility] Meds to Bed program, availability of treatment team to discuss questions or concerns patient and/or family may have regarding understanding medications and recognizing signs and symptoms once discharged  CM also encouraged patient to follow up with all recommended appointments after discharge  Patient advised of importance for patient and family to participate in managing patients medical well being  Patient/caregiver received discharge checklist   Content reviewed  Patient/caregiver encouraged to participate in discharge plan of care prior to discharge home

## 2022-08-10 PROBLEM — B96.20 E COLI BACTEREMIA: Status: ACTIVE | Noted: 2022-08-10

## 2022-08-10 PROBLEM — R78.81 E COLI BACTEREMIA: Status: ACTIVE | Noted: 2022-08-10

## 2022-08-10 LAB
ANION GAP SERPL CALCULATED.3IONS-SCNC: 6 MMOL/L (ref 4–13)
BASOPHILS # BLD AUTO: 0.02 THOUSANDS/ΜL (ref 0–0.1)
BASOPHILS NFR BLD AUTO: 0 % (ref 0–1)
BUN SERPL-MCNC: 32 MG/DL (ref 5–25)
CALCIUM SERPL-MCNC: 8.8 MG/DL (ref 8.3–10.1)
CHLORIDE SERPL-SCNC: 110 MMOL/L (ref 96–108)
CO2 SERPL-SCNC: 25 MMOL/L (ref 21–32)
CREAT SERPL-MCNC: 1.8 MG/DL (ref 0.6–1.3)
EOSINOPHIL # BLD AUTO: 0.01 THOUSAND/ΜL (ref 0–0.61)
EOSINOPHIL NFR BLD AUTO: 0 % (ref 0–6)
ERYTHROCYTE [DISTWIDTH] IN BLOOD BY AUTOMATED COUNT: 12.9 % (ref 11.6–15.1)
GFR SERPL CREATININE-BSD FRML MDRD: 41 ML/MIN/1.73SQ M
GLUCOSE SERPL-MCNC: 130 MG/DL (ref 65–140)
GLUCOSE SERPL-MCNC: 131 MG/DL (ref 65–140)
GLUCOSE SERPL-MCNC: 138 MG/DL (ref 65–140)
GLUCOSE SERPL-MCNC: 140 MG/DL (ref 65–140)
GLUCOSE SERPL-MCNC: 172 MG/DL (ref 65–140)
HCT VFR BLD AUTO: 43.6 % (ref 36.5–49.3)
HGB BLD-MCNC: 13.9 G/DL (ref 12–17)
IMM GRANULOCYTES # BLD AUTO: 0.26 THOUSAND/UL (ref 0–0.2)
IMM GRANULOCYTES NFR BLD AUTO: 2 % (ref 0–2)
LYMPHOCYTES # BLD AUTO: 1.08 THOUSANDS/ΜL (ref 0.6–4.47)
LYMPHOCYTES NFR BLD AUTO: 7 % (ref 14–44)
MCH RBC QN AUTO: 29.1 PG (ref 26.8–34.3)
MCHC RBC AUTO-ENTMCNC: 31.9 G/DL (ref 31.4–37.4)
MCV RBC AUTO: 91 FL (ref 82–98)
MONOCYTES # BLD AUTO: 0.95 THOUSAND/ΜL (ref 0.17–1.22)
MONOCYTES NFR BLD AUTO: 6 % (ref 4–12)
NEUTROPHILS # BLD AUTO: 13.96 THOUSANDS/ΜL (ref 1.85–7.62)
NEUTS SEG NFR BLD AUTO: 85 % (ref 43–75)
NRBC BLD AUTO-RTO: 0 /100 WBCS
PLATELET # BLD AUTO: 248 THOUSANDS/UL (ref 149–390)
PMV BLD AUTO: 10.6 FL (ref 8.9–12.7)
POTASSIUM SERPL-SCNC: 3.8 MMOL/L (ref 3.5–5.3)
RBC # BLD AUTO: 4.77 MILLION/UL (ref 3.88–5.62)
SODIUM SERPL-SCNC: 141 MMOL/L (ref 135–147)
WBC # BLD AUTO: 16.28 THOUSAND/UL (ref 4.31–10.16)

## 2022-08-10 PROCEDURE — 82948 REAGENT STRIP/BLOOD GLUCOSE: CPT

## 2022-08-10 PROCEDURE — 85025 COMPLETE CBC W/AUTO DIFF WBC: CPT | Performed by: INTERNAL MEDICINE

## 2022-08-10 PROCEDURE — 80048 BASIC METABOLIC PNL TOTAL CA: CPT | Performed by: INTERNAL MEDICINE

## 2022-08-10 PROCEDURE — 99232 SBSQ HOSP IP/OBS MODERATE 35: CPT | Performed by: FAMILY MEDICINE

## 2022-08-10 PROCEDURE — 99232 SBSQ HOSP IP/OBS MODERATE 35: CPT | Performed by: PHYSICIAN ASSISTANT

## 2022-08-10 RX ORDER — OXYBUTYNIN CHLORIDE 5 MG/1
5 TABLET ORAL 2 TIMES DAILY
Status: DISCONTINUED | OUTPATIENT
Start: 2022-08-10 | End: 2022-08-11 | Stop reason: HOSPADM

## 2022-08-10 RX ADMIN — OXYCODONE HYDROCHLORIDE 5 MG: 5 TABLET ORAL at 11:57

## 2022-08-10 RX ADMIN — INSULIN LISPRO 2 UNITS: 100 INJECTION, SOLUTION INTRAVENOUS; SUBCUTANEOUS at 11:53

## 2022-08-10 RX ADMIN — CEFTRIAXONE 2000 MG: 10 INJECTION, POWDER, FOR SOLUTION INTRAVENOUS at 21:44

## 2022-08-10 RX ADMIN — SODIUM CHLORIDE 100 ML/HR: 0.9 INJECTION, SOLUTION INTRAVENOUS at 05:03

## 2022-08-10 RX ADMIN — HEPARIN SODIUM 5000 UNITS: 5000 INJECTION INTRAVENOUS; SUBCUTANEOUS at 13:31

## 2022-08-10 RX ADMIN — SODIUM CHLORIDE 100 ML/HR: 0.9 INJECTION, SOLUTION INTRAVENOUS at 15:34

## 2022-08-10 RX ADMIN — OXYBUTYNIN CHLORIDE 5 MG: 5 TABLET ORAL at 17:00

## 2022-08-10 RX ADMIN — HEPARIN SODIUM 5000 UNITS: 5000 INJECTION INTRAVENOUS; SUBCUTANEOUS at 05:02

## 2022-08-10 RX ADMIN — TAMSULOSIN HYDROCHLORIDE 0.4 MG: 0.4 CAPSULE ORAL at 16:48

## 2022-08-10 RX ADMIN — HEPARIN SODIUM 5000 UNITS: 5000 INJECTION INTRAVENOUS; SUBCUTANEOUS at 21:44

## 2022-08-10 RX ADMIN — OXYCODONE HYDROCHLORIDE 5 MG: 5 TABLET ORAL at 05:03

## 2022-08-10 RX ADMIN — OXYCODONE HYDROCHLORIDE 5 MG: 5 TABLET ORAL at 16:48

## 2022-08-10 RX ADMIN — AMLODIPINE BESYLATE 10 MG: 10 TABLET ORAL at 08:29

## 2022-08-10 RX ADMIN — HYDROMORPHONE HYDROCHLORIDE 0.2 MG: 1 INJECTION, SOLUTION INTRAMUSCULAR; INTRAVENOUS; SUBCUTANEOUS at 08:29

## 2022-08-10 NOTE — PROGRESS NOTES
[address]  Progress Note - Estefany Juárez 1965, 56 y o  male MRN: [id number]  Unit/Bed#: [facility] 821-01 Encounter: [id number]  Primary Care Provider: Aries Schultz MD   Date and time admitted to hospital: 8/8/2022  5:23 PM    * Sepsis on admission  Assessment & Plan  Presents with a high-grade fever coupled w/ tachycardia/leukocytosis, along with lactic acidosis and elevated procalcitonin (trend until peak)  In the setting of obstructive pyelonephritis (see below) -> urine cultures from before grew E Coli, BC 1/2 growing EColi too  Change IV Abx from cefepime to IV rocephin  Monitor vitals and maintain hemodynamics - continue IV fluids  Supportive care otherwise  Once a final cultures and sensitivities back Likely transition to cefazolin    E coli bacteremia  Assessment & Plan  · Antibiotics follow-up on the final culture      Hyponatremia  Assessment & Plan  Mildly diminished at 132 - trialed on IV fluids in the setting of severe sepsis  resolved    Acute kidney injury superimposed on chronic kidney disease stage 3   Assessment & Plan  Acute worsening in the setting of obstructive pyelonephritis  Baseline creatinine approximate 1 4-1 5 over the last year @ [facility] - presented @ 2 56 -> now 2 3  Anticipate improvement w/ management of sepsis and genitourinary decompression/stenting   Continue IV fluids - monitor renal function and urine output - limit/avoid nephrotoxins possible - avoid hypotension as possible - holding Cozaar  Creatinine down trending    Morbid obesity  Assessment & Plan  BMI of 41 21  Lifestyle/diet modifications    Diabetes mellitus type 2   Assessment & Plan  Lab Results   Component Value Date    HGBA1C 5 7 02/10/2022     Hold oral hypoglycemics while hospitalized  Initiate SSI coverage per Accu-Cheks  Aggressive blood sugar control in the setting of sepsis  Carbohydrate restricted diet    Essential hypertension  Assessment & Plan  Optimize pain control  Continue Norvasc - held Cozaar preoperatively and in the setting of acute kidney injury (if renal function stable, can resume over the next few days) - PRN IV Hydralazine on board for BP spikes  Low-sodium diet    Obstructive pyelonephritis  Assessment & Plan  CT imaging on 8/6 revealed: "Moderately obstructing 5 x 4 x 3 mm mid left ureteric calculus located at the level of the superior endplate of the L4 vertebral body  This has migrated from the lower pole of the left kidney  [facility] Additional nonobstructing bilateral renal calyceal calculi  [facility] Abnormal appearance of the urinary bladder, new from the prior study  There is moderate right-sided hydroureter and mild left-sided hydroureter without obstructing distal ureteric calculi  Correlate for ureteral obstruction secondary to cystitis versus bladder outlet obstruction from prostatic enlargement  [facility] Mild abnormal appearance of the prostate gland  Correlate for acute prostatitis "  Urgently taken to the OR from the ED w/ plan for cystoscopy with retrograde pyelogram and bilateral ureteral stenting in the setting of sepsis  Continue IV fluids and IV Abx  PRN pain/emesis control  Monitor renal function  Further management and postoperative recommendations per primary service (urology) - initiated on Flomax        VTE Pharmacologic Prophylaxis:   heparin    Patient Centered Rounds: I performed bedside rounds with nursing staff today  Discussions with Specialists or Other Care Team Provider:     Education and Discussions with Family / Patient: Patient  Time Spent for Care: 30 minutes  More than 50% of total time spent on counseling and coordination of care as described above  Current Length of Stay: 2 day(s)  Current Patient Status: Inpatient   Certification Statement: The patient will continue to require additional inpatient hospital stay due to Pain control  Discharge Plan: Dimas Paul is following this patient on consult   They are not yet medically stable for discharge secondary to Pending blood culture  Code Status: Level 1 - Full Code    Subjective:   Patient seen and examined  Still with lot of pain  No other specific complaints  Objective:     Vitals:   Temp (24hrs), Av 6 °F (37 6 °C), Min:98 6 °F (37 °C), Max:100 4 °F (38 °C)    Temp:  [98 6 °F (37 °C)-100 4 °F (38 °C)] 100 4 °F (38 °C)  HR:  [68-85] 83  Resp:  [16-18] 18  BP: (111-139)/(69-75) 117/69  SpO2:  [94 %-96 %] 96 %  Body mass index is 41 05 kg/m²  Input and Output Summary (last 24 hours): Intake/Output Summary (Last 24 hours) at 8/10/2022 1901  Last data filed at 8/10/2022 1601  Gross per 24 hour   Intake 1000 ml   Output 5800 ml   Net -4800 ml       Physical Exam:   Physical Exam  Constitutional:       General: He is not in acute distress  Appearance: He is obese  HENT:      Head: Normocephalic  Nose: Nose normal    Eyes:      General: No scleral icterus  Cardiovascular:      Rate and Rhythm: Normal rate  Pulses: Normal pulses  Pulmonary:      Effort: Pulmonary effort is normal    Abdominal:      General: There is no distension  Tenderness: There is no abdominal tenderness  Comments: Obese abdomen   Genitourinary:     Comments: Olmedo catheter present  Musculoskeletal:      Cervical back: Normal range of motion and neck supple  Skin:     General: Skin is warm  Neurological:      General: No focal deficit present  Mental Status: He is alert         Additional Data:     Labs:  Results from last 7 days   Lab Units 08/10/22  0534   WBC Thousand/uL 16 28*   HEMOGLOBIN g/dL 13 9   HEMATOCRIT % 43 6   PLATELETS Thousands/uL 248   NEUTROS PCT % 85*   LYMPHS PCT % 7*   MONOS PCT % 6   EOS PCT % 0     Results from last 7 days   Lab Units 08/10/22  0534 22  0612 22  1750   SODIUM mmol/L 141   < > 132*   POTASSIUM mmol/L 3 8   < > 3 8   CHLORIDE mmol/L 110*   < > 100   CO2 mmol/L 25   < > 25   BUN mg/dL 32*   < > 31*   CREATININE mg/dL 1 80*   < > 2 56*   ANION GAP mmol/L 6   < > 7   CALCIUM mg/dL 8 8   < > 9 5   ALBUMIN g/dL  --   --  2 9*   TOTAL BILIRUBIN mg/dL  --   --  1 20*   ALK PHOS U/L  --   --  122*   ALT U/L  --   --  35   AST U/L  --   --  27   GLUCOSE RANDOM mg/dL 138   < > 171*    < > = values in this interval not displayed  Results from last 7 days   Lab Units 08/08/22  1750   INR  1 11     Results from last 7 days   Lab Units 08/10/22  1616 08/10/22  1049 08/10/22  0712 08/09/22  2054 08/09/22  1557 08/09/22  1100 08/09/22  0612 08/08/22 2051 08/08/22  1958   POC GLUCOSE mg/dl 130 172* 131 155* 155* 230* 240* 152* 146*         Results from last 7 days   Lab Units 08/09/22  0612 08/08/22  2155 08/08/22  1750   LACTIC ACID mmol/L 1 3 0 9 2 6*   PROCALCITONIN ng/ml 5 55*  --  8 30*       Lines/Drains:  Invasive Devices  Report    Peripheral Intravenous Line  Duration           Peripheral IV 08/09/22 Left;Ventral (anterior) Forearm 1 day                      Imaging: Reviewed radiology reports from this admission including: abdominal/pelvic CT    Recent Cultures (last 7 days):   Results from last 7 days   Lab Units 08/09/22  1658 08/08/22  1912 08/08/22  1750 08/08/22  1735 08/06/22  0955   BLOOD CULTURE  Received in Microbiology Lab  Culture in Progress  Received in Microbiology Lab  Culture in Progress    --  No Growth at 24 hrs   --   --    GRAM STAIN RESULT   --   --  Gram negative rods*  --   --    URINE CULTURE   --  >100,000 cfu/ml Gram Negative Parag Enteric Like*  --  >100,000 cfu/ml Gram Negative Parag Enteric Like* >100,000 cfu/ml Escherichia coli*       Last 24 Hours Medication List:   Current Facility-Administered Medications   Medication Dose Route Frequency Provider Last Rate    acetaminophen  650 mg Oral Q6H PRN Manny Scott MD      amLODIPine  10 mg Oral Daily Manny Scott MD      cefTRIAXone  2,000 mg Intravenous Q24H Radha Gurrola MD 2,000 mg (08/09/22 2100)    heparin (porcine)  5,000 Units Subcutaneous [place] [address] Alondra Gage MD      hydrALAZINE  5 mg Intravenous Q6H PRN Feliberto Murphy MD      HYDROmorphone  0 2 mg Intravenous Q3H PRN Alondra Gage MD      insulin lispro  2-12 Units Subcutaneous 4x Daily (AC & HS) Feliberto Murphy MD      ondansetron  4 mg Intravenous Q4H PRN Feliberto Murphy MD      oxybutynin  5 mg Oral BID Deisy CalvertSOCRATES tyson      oxyCODONE  5 mg Oral Q4H PRN Alondra Gage MD      sodium chloride  100 mL/hr Intravenous Continuous Leisa Villasenor  mL/hr (08/10/22 1534)    tamsulosin  0 4 mg Oral Daily With Duke Tomas MD          Today, Patient Was Seen By: Vita Sheehan MD    **Please Note: This note may have been constructed using a voice recognition system  **

## 2022-08-10 NOTE — ASSESSMENT & PLAN NOTE
Presents with a high-grade fever coupled w/ tachycardia/leukocytosis, along with lactic acidosis and elevated procalcitonin (trend until peak)  In the setting of obstructive pyelonephritis (see below) -> urine cultures from before grew E Coli, BC 1/2 growing EColi too  Change IV Abx from cefepime to IV rocephin  Monitor vitals and maintain hemodynamics - continue IV fluids  Supportive care otherwise  Once a final cultures and sensitivities back Likely transition to cefazolin

## 2022-08-10 NOTE — ASSESSMENT & PLAN NOTE
CT imaging on 8/6 revealed: "Moderately obstructing 5 x 4 x 3 mm mid left ureteric calculus located at the level of the superior endplate of the L4 vertebral body  This has migrated from the lower pole of the left kidney  [facility] Additional nonobstructing bilateral renal calyceal calculi  [facility] Abnormal appearance of the urinary bladder, new from the prior study  There is moderate right-sided hydroureter and mild left-sided hydroureter without obstructing distal ureteric calculi  Correlate for ureteral obstruction secondary to cystitis versus bladder outlet obstruction from prostatic enlargement  [facility] Mild abnormal appearance of the prostate gland    Correlate for acute prostatitis "  Urgently taken to the OR from the ED w/ plan for cystoscopy with retrograde pyelogram and bilateral ureteral stenting in the setting of sepsis  Continue IV fluids and IV Abx  PRN pain/emesis control  Monitor renal function  Further management and postoperative recommendations per primary service (urology) - initiated on Flomax

## 2022-08-10 NOTE — PROGRESS NOTES
Progress Note - urology  Juan Garcia 56 y o  male MRN: [id number]  Unit/Bed#: [facility] 821-01 Encounter: [id number]    Assessment & Plan:    Bilateral hydronephrosis with sepsis:  -CT scan on admission revealed moderately obstructing 5 x 4 x 3 mm mid left ureteral calculus located at the level of the superior endplate of the L4 vertebrae in body  Additional nonobstructing bilateral renal calyceal calculi  Abnormal appearance urinary bladder new from prior study  There is moderate right-sided hydroureteronephrosis left hydroureter without obstructing distal ureteral calculi  Correlate for ureteral obstruction secondary to cystitis versus bladder outlet obstruction from prostatic enlargement  Mild abnormal appearance of prostate gland correlate for acute prostatitis  -status post cystoscopy bilateral retrograde pyelogram and bilateral ureteral stent insertion  -normal urethral trauma trilobar prostatic enlargement, orthotropic ureteral orifice ease with marked overlying erythema of the trigone and base the bladder, diffuse cystitis changes in the bladder  Purulence from bilateral right and left kidneys upon placement of wires  -urine culture from 08/06 positive for E coli, pansensitive, additional urine culture from intraop currently pending, blood cultures obtained coming back positive for Gram-negative rods  Repeat cultures pending  -continue with medical optimization with IV fluids and antibiotics, will adjust antibiotics based off cultures  Medicine currently consulted, appreciate their recommendations and assistance in managing this patient from a medical standpoint  -BURTON on admission 2 56, currently slowly trending downward 2 3, baseline 1 80 1 4-1 5 last year  Will continue to trend downward  Most are and currently on hold  -lactic acidosis resolved  -leukocytosis of 16 today   -urethral Olmedo catheter removed today, patient has been afebrile for 24 hours      Type 2 diabetes mellitus:  -hemoglobin A1c 5 7  -sliding scale insulin,  -Medicine assisting with management  Essential hypertension:  -Norvasc and losartan, Norvasc continued, losartan on hold due to BURTON  -Medicine assisting with management      Subjective/Objective   Chief Complaint:  Spasms    Subjective:   Patient currently sitting comfortably in bed no acute distress  Denies any current fevers or chills  Reports he was having fevers overnight  Denies any nausea or vomiting  Denies any current significant pain  Reports he has some mild tenderness post mostly from the stents which is tolerable  Reports he has some mild spasms with ureteral stents and urethral Olmedo catheter  Objective:     Blood pressure 139/75, pulse 85, temperature 99 8 °F (37 7 °C), resp  rate 18, height 5' 8" (1 727 m), weight 122 kg (270 lb), SpO2 95 %  ,Body mass index is 41 05 kg/m²  Intake/Output Summary (Last 24 hours) at 8/10/2022 1331  Last data filed at 8/10/2022 0801  Gross per 24 hour   Intake 1191 67 ml   Output 3850 ml   Net -2658 33 ml       Invasive Devices  Report    Peripheral Intravenous Line  Duration           Peripheral IV 08/09/22 Left;Ventral (anterior) Forearm 1 day          Drain  Duration           Urethral Catheter Coude 18 Fr  1 day              Physical Exam  Constitutional:       General: He is not in acute distress  Appearance: He is obese  He is not toxic-appearing or diaphoretic  HENT:      Head: Normocephalic and atraumatic  Right Ear: External ear normal       Left Ear: External ear normal       Mouth/Throat:      Pharynx: Oropharynx is clear  Eyes:      Conjunctiva/sclera: Conjunctivae normal    Cardiovascular:      Rate and Rhythm: Normal rate and regular rhythm  Pulses: Normal pulses  Heart sounds: No murmur heard  No friction rub  No gallop  Pulmonary:      Effort: Pulmonary effort is normal  No respiratory distress  Breath sounds: No wheezing, rhonchi or rales  Abdominal:      General: Bowel sounds are normal  There is no distension  Tenderness: There is no abdominal tenderness  There is no right CVA tenderness or left CVA tenderness  Genitourinary:     Comments: Urethral Olmedo catheter in place draining clear yellow urine  Musculoskeletal:         General: Normal range of motion  Skin:     General: Skin is warm and dry  Neurological:      General: No focal deficit present  Mental Status: He is alert and oriented to person, place, and time  Psychiatric:         Mood and Affect: Mood normal          Behavior: Behavior normal          Thought Content: Thought content normal          Judgment: Judgment normal          Lab, Imaging and other studies:I have personally reviewed pertinent lab results      Lab Results   Component Value Date    WBC 16 28 (H) 08/10/2022    HGB 13 9 08/10/2022    HCT 43 6 08/10/2022    MCV 91 08/10/2022     08/10/2022     Lab Results   Component Value Date    SODIUM 141 08/10/2022    K 3 8 08/10/2022     (H) 08/10/2022    CO2 25 08/10/2022    BUN 32 (H) 08/10/2022    CREATININE 1 80 (H) 08/10/2022    GLUC 138 08/10/2022    CALCIUM 8 8 08/10/2022       VTE Pharmacologic Prophylaxis: Heparin  VTE Mechanical Prophylaxis: sequential compression device      Eren Kaur PA-C

## 2022-08-10 NOTE — ASSESSMENT & PLAN NOTE
Acute worsening in the setting of obstructive pyelonephritis  Baseline creatinine approximate 1 4-1 5 over the last year @ [facility] - presented @ 2 56 -> now 2 3  Anticipate improvement w/ management of sepsis and genitourinary decompression/stenting   Continue IV fluids - monitor renal function and urine output - limit/avoid nephrotoxins possible - avoid hypotension as possible - holding Cozaar  Creatinine down trending

## 2022-08-11 ENCOUNTER — TELEPHONE (OUTPATIENT)
Dept: OTHER | Facility: HOSPITAL | Age: 57
End: 2022-08-11

## 2022-08-11 VITALS
OXYGEN SATURATION: 92 % | SYSTOLIC BLOOD PRESSURE: 121 MMHG | DIASTOLIC BLOOD PRESSURE: 74 MMHG | WEIGHT: 270 LBS | HEART RATE: 75 BPM | BODY MASS INDEX: 40.92 KG/M2 | HEIGHT: 68 IN | TEMPERATURE: 98.9 F | RESPIRATION RATE: 16 BRPM

## 2022-08-11 LAB
ANION GAP SERPL CALCULATED.3IONS-SCNC: 8 MMOL/L (ref 4–13)
BACTERIA BLD CULT: ABNORMAL
BACTERIA UR CULT: ABNORMAL
BACTERIA UR CULT: ABNORMAL
BASOPHILS # BLD MANUAL: 0.12 THOUSAND/UL (ref 0–0.1)
BASOPHILS NFR MAR MANUAL: 1 % (ref 0–1)
BUN SERPL-MCNC: 26 MG/DL (ref 5–25)
CALCIUM SERPL-MCNC: 9.3 MG/DL (ref 8.3–10.1)
CHLORIDE SERPL-SCNC: 107 MMOL/L (ref 96–108)
CO2 SERPL-SCNC: 25 MMOL/L (ref 21–32)
CREAT SERPL-MCNC: 1.58 MG/DL (ref 0.6–1.3)
E COLI DNA BLD POS QL NAA+NON-PROBE: DETECTED
EOSINOPHIL # BLD MANUAL: 0.36 THOUSAND/UL (ref 0–0.4)
EOSINOPHIL NFR BLD MANUAL: 3 % (ref 0–6)
ERYTHROCYTE [DISTWIDTH] IN BLOOD BY AUTOMATED COUNT: 13 % (ref 11.6–15.1)
GFR SERPL CREATININE-BSD FRML MDRD: 48 ML/MIN/1.73SQ M
GLUCOSE SERPL-MCNC: 114 MG/DL (ref 65–140)
GLUCOSE SERPL-MCNC: 127 MG/DL (ref 65–140)
GLUCOSE SERPL-MCNC: 127 MG/DL (ref 65–140)
GRAM STN SPEC: ABNORMAL
HCT VFR BLD AUTO: 42.4 % (ref 36.5–49.3)
HGB BLD-MCNC: 13.8 G/DL (ref 12–17)
LYMPHOCYTES # BLD AUTO: 18 % (ref 14–44)
LYMPHOCYTES # BLD AUTO: 2.18 THOUSAND/UL (ref 0.6–4.47)
MCH RBC QN AUTO: 29.1 PG (ref 26.8–34.3)
MCHC RBC AUTO-ENTMCNC: 32.5 G/DL (ref 31.4–37.4)
MCV RBC AUTO: 89 FL (ref 82–98)
MONOCYTES # BLD AUTO: 0.61 THOUSAND/UL (ref 0–1.22)
MONOCYTES NFR BLD: 5 % (ref 4–12)
MYELOCYTES NFR BLD MANUAL: 2 % (ref 0–1)
NEUTROPHILS # BLD MANUAL: 8.6 THOUSAND/UL (ref 1.85–7.62)
NEUTS BAND NFR BLD MANUAL: 4 % (ref 0–8)
NEUTS SEG NFR BLD AUTO: 67 % (ref 43–75)
PLATELET # BLD AUTO: 248 THOUSANDS/UL (ref 149–390)
PLATELET BLD QL SMEAR: ADEQUATE
PMV BLD AUTO: 10.4 FL (ref 8.9–12.7)
POLYCHROMASIA BLD QL SMEAR: PRESENT
POTASSIUM SERPL-SCNC: 3.5 MMOL/L (ref 3.5–5.3)
RBC # BLD AUTO: 4.75 MILLION/UL (ref 3.88–5.62)
RBC MORPH BLD: PRESENT
SODIUM SERPL-SCNC: 140 MMOL/L (ref 135–147)
WBC # BLD AUTO: 12.11 THOUSAND/UL (ref 4.31–10.16)

## 2022-08-11 PROCEDURE — 99232 SBSQ HOSP IP/OBS MODERATE 35: CPT | Performed by: PHYSICIAN ASSISTANT

## 2022-08-11 PROCEDURE — 85007 BL SMEAR W/DIFF WBC COUNT: CPT | Performed by: INTERNAL MEDICINE

## 2022-08-11 PROCEDURE — NC001 PR NO CHARGE: Performed by: PHYSICIAN ASSISTANT

## 2022-08-11 PROCEDURE — 80048 BASIC METABOLIC PNL TOTAL CA: CPT | Performed by: INTERNAL MEDICINE

## 2022-08-11 PROCEDURE — 82948 REAGENT STRIP/BLOOD GLUCOSE: CPT

## 2022-08-11 PROCEDURE — 99232 SBSQ HOSP IP/OBS MODERATE 35: CPT | Performed by: FAMILY MEDICINE

## 2022-08-11 PROCEDURE — 85027 COMPLETE CBC AUTOMATED: CPT | Performed by: INTERNAL MEDICINE

## 2022-08-11 RX ORDER — CIPROFLOXACIN 500 MG/1
500 TABLET, FILM COATED ORAL EVERY 12 HOURS SCHEDULED
Qty: 20 TABLET | Refills: 0 | Status: SHIPPED | OUTPATIENT
Start: 2022-08-11 | End: 2022-08-21

## 2022-08-11 RX ORDER — TAMSULOSIN HYDROCHLORIDE 0.4 MG/1
0.4 CAPSULE ORAL
Qty: 30 CAPSULE | Refills: 0 | Status: SHIPPED | OUTPATIENT
Start: 2022-08-11

## 2022-08-11 RX ORDER — DOCUSATE SODIUM 100 MG/1
100 CAPSULE, LIQUID FILLED ORAL 2 TIMES DAILY PRN
Qty: 30 CAPSULE | Refills: 0 | Status: SHIPPED | OUTPATIENT
Start: 2022-08-11

## 2022-08-11 RX ORDER — OXYBUTYNIN CHLORIDE 5 MG/1
5 TABLET ORAL 3 TIMES DAILY PRN
Qty: 30 TABLET | Refills: 0 | Status: SHIPPED | OUTPATIENT
Start: 2022-08-11

## 2022-08-11 RX ORDER — HYDROCODONE BITARTRATE AND ACETAMINOPHEN 5; 325 MG/1; MG/1
1 TABLET ORAL EVERY 6 HOURS PRN
Qty: 5 TABLET | Refills: 0 | Status: SHIPPED | OUTPATIENT
Start: 2022-08-11 | End: 2022-08-21

## 2022-08-11 RX ADMIN — TAMSULOSIN HYDROCHLORIDE 0.4 MG: 0.4 CAPSULE ORAL at 17:24

## 2022-08-11 RX ADMIN — HEPARIN SODIUM 5000 UNITS: 5000 INJECTION INTRAVENOUS; SUBCUTANEOUS at 14:07

## 2022-08-11 RX ADMIN — HEPARIN SODIUM 5000 UNITS: 5000 INJECTION INTRAVENOUS; SUBCUTANEOUS at 05:07

## 2022-08-11 RX ADMIN — AMLODIPINE BESYLATE 10 MG: 10 TABLET ORAL at 10:08

## 2022-08-11 RX ADMIN — OXYBUTYNIN CHLORIDE 5 MG: 5 TABLET ORAL at 10:08

## 2022-08-11 RX ADMIN — SODIUM CHLORIDE 100 ML/HR: 0.9 INJECTION, SOLUTION INTRAVENOUS at 01:15

## 2022-08-11 RX ADMIN — OXYCODONE HYDROCHLORIDE 5 MG: 5 TABLET ORAL at 17:24

## 2022-08-11 RX ADMIN — SODIUM CHLORIDE 100 ML/HR: 0.9 INJECTION, SOLUTION INTRAVENOUS at 11:36

## 2022-08-11 RX ADMIN — OXYBUTYNIN CHLORIDE 5 MG: 5 TABLET ORAL at 17:24

## 2022-08-11 NOTE — ASSESSMENT & PLAN NOTE
Presents with a high-grade fever coupled w/ tachycardia/leukocytosis, along with lactic acidosis and elevated procalcitonin (trend until peak)  In the setting of obstructive pyelonephritis (see below) -> urine cultures from before grew E Coli, BC 1/2 growing EColi too  Pansensitive E coli    Can transition to p o  antibiotics at the time of discharge to finish a total of 10-14 day course

## 2022-08-11 NOTE — ASSESSMENT & PLAN NOTE
Lab Results   Component Value Date    HGBA1C 5 7 02/10/2022     Hold oral hypoglycemics while hospitalized  Initiate SSI coverage per Accu-Cheks  Aggressive blood sugar control in the setting of sepsis  Carbohydrate restricted diet  Restart back on the outpatient medication at the time of discharge

## 2022-08-11 NOTE — UTILIZATION REVIEW
Continued Stay Review    Date: 8/11/22                          Current Patient Class: inpatient  Current Level of Care: med surg    HPI:57 y o  male initially admitted on 8/8/22  Sepsis, Obstructive pyelonephritis    Assessment/Plan:  Pt continues to c/o pain  Continue pain and nausea control  Olmedo catheter remains  Initiated on Flomax  Urine culture from 08/06 positive for E coli, pansensitive, additional urine culture from intraop currently pending, blood cultures ontained coming back positive for Gram-negative rods  Repeat cultures pending  Continue IVF and IV ABX, f/u on cxs  Hyponatremia resolved  Cr trending down (baseline 1 4)  Monitor renal function and UOP  Continue accuchecks w/ssi, hold po hypoglycemics  Low Na diet, continue Norvasc, hold Cozaar, IV Hydralazine prn      Vital Signs:   Date/Time Temp Pulse Resp BP MAP (mmHg) SpO2 O2 Device   08/10/22 23:07:42 99 °F (37 2 °C) 66 18 112/75 87 97 % --   08/10/22 2014 -- -- -- -- -- -- None (Room air)   08/10/22 15:27:21 100 4 °F (38 °C) 83 18 117/69 85 96 % --   08/10/22 07:14:58 99 8 °F (37 7 °C) 85 18 139/75 96 95 % --   08/09/22 22:21:58 98 6 °F (37 °C) 68 16 111/69 83 94 % --   08/09/22 1608 -- -- -- -- -- 94 % None (Room air)   08/09/22 14:39:27 -- 66 20 130/79 96 94 % --   08/09/22 07:37:29 97 3 °F (36 3 °C) Abnormal  62 -- 120/77 91 92 % --   08/09/22 0550 -- -- -- -- -- -- None (Room air)     Pertinent Labs/Diagnostic Results:   Results from last 7 days   Lab Units 08/08/22  1933   SARS-COV-2  Negative     Results from last 7 days   Lab Units 08/11/22  0434 08/10/22  0534 08/09/22  0612 08/08/22  1750   WBC Thousand/uL 12 11* 16 28* 15 13* 14 88*   HEMOGLOBIN g/dL 13 8 13 9 14 7 15 7   HEMATOCRIT % 42 4 43 6 44 4 47 9   PLATELETS Thousands/uL 248 248 225 264   NEUTROS ABS Thousands/µL  --  13 96* 13 40* 11 95*     Results from last 7 days   Lab Units 08/11/22  0434 08/10/22  0534 08/09/22  0612 08/08/22  1750   SODIUM mmol/L 140 141 137 132*   POTASSIUM mmol/L 3 5 3 8 4 3 3 8   CHLORIDE mmol/L 107 110* 105 100   CO2 mmol/L 25 25 25 25   ANION GAP mmol/L 8 6 7 7   BUN mg/dL 26* 32* 35* 31*   CREATININE mg/dL 1 58* 1 80* 2 38* 2 56*   EGFR ml/min/1 73sq m 48 41 29 27   CALCIUM mg/dL 9 3 8 8 9 6 9 5     Results from last 7 days   Lab Units 08/08/22  1750   AST U/L 27   ALT U/L 35   ALK PHOS U/L 122*   TOTAL PROTEIN g/dL 8 8*   ALBUMIN g/dL 2 9*   TOTAL BILIRUBIN mg/dL 1 20*     Results from last 7 days   Lab Units 08/10/22  2058 08/10/22  1616 08/10/22  1049 08/10/22  0712 08/09/22  2054 08/09/22  1557 08/09/22  1100 08/09/22  0612 08/08/22  2051 08/08/22  1958   POC GLUCOSE mg/dl 140 130 172* 131 155* 155* 230* 240* 152* 146*     Results from last 7 days   Lab Units 08/11/22  0434 08/10/22  0534 08/09/22  0612 08/08/22  1750   GLUCOSE RANDOM mg/dL 114 138 241* 171*     Results from last 7 days   Lab Units 08/08/22  1750   PROTIME seconds 14 5   INR  1 11   PTT seconds 32     Results from last 7 days   Lab Units 08/09/22  0612 08/08/22  1750   PROCALCITONIN ng/ml 5 55* 8 30*     Results from last 7 days   Lab Units 08/09/22  0612 08/08/22  2155 08/08/22  1750   LACTIC ACID mmol/L 1 3 0 9 2 6*     Results from last 7 days   Lab Units 08/08/22  1750   LIPASE u/L 305     Results from last 7 days   Lab Units 08/08/22  1735 08/06/22  0955   CLARITY UA  Cloudy Cloudy*   COLOR UA  Orange Yellow   SPEC GRAV UA  1 015 1 015   PH UA  5 5 6 0   GLUCOSE UA mg/dl 500 (1/2%)* >=1000 (1%)*   KETONES UA mg/dl Negative 5 (Trace)*   BLOOD UA  Moderate* 250 0*   PROTEIN UA mg/dl >=300* >=500*   NITRITE UA  Negative Negative   BILIRUBIN UA  Negative Negative   UROBILINOGEN UA E U /dl 0 2 1 0   LEUKOCYTES UA  Small* 500 0*   WBC UA /hpf Innumerable* Innumerable*   RBC UA /hpf 4-10* 20-30*   BACTERIA UA /hpf Occasional Innumerable*   EPITHELIAL CELLS WET PREP /hpf Occasional Occasional     Results from last 7 days   Lab Units 08/09/22  1658 08/08/22  1912 08/08/22  1750 08/08/22  1735 08/06/22  0955   BLOOD CULTURE  No Growth at 24 hrs  No Growth at 24 hrs   --  No Growth at 48 hrs   --   --    GRAM STAIN RESULT   --   --  Gram negative rods*  --   --    URINE CULTURE   --  >100,000 cfu/ml Gram Negative Parag Enteric Like*  --  >100,000 cfu/ml Gram Negative Parag Enteric Like* >100,000 cfu/ml Escherichia coli*     Medications:   Scheduled Medications:  amLODIPine, 10 mg, Oral, Daily  cefTRIAXone, 2,000 mg, Intravenous, Q24H  heparin (porcine), 5,000 Units, Subcutaneous, Q8H ERIS  insulin lispro, 2-12 Units, Subcutaneous, 4x Daily (AC & HS)  oxybutynin, 5 mg, Oral, BID  tamsulosin, 0 4 mg, Oral, Daily With Dinner      Continuous IV Infusions:  sodium chloride, 100 mL/hr, Intravenous, Continuous      PRN Meds:  acetaminophen, 650 mg, Oral, Q6H PRN  hydrALAZINE, 5 mg, Intravenous, Q6H PRN  HYDROmorphone, 0 2 mg, Intravenous, Q3H PRN  ondansetron, 4 mg, Intravenous, Q4H PRN  oxyCODONE, 5 mg, Oral, Q4H PRN        Discharge Plan: d    Network Utilization Review Department  ATTENTION: Please call with any questions or concerns to [phone] and carefully listen to the prompts so that you are directed to the right person  All voicemails are confidential   [facility] all requests for admission clinical reviews, approved or denied determinations and any other requests to dedicated fax number below belonging to the campus where the patient is receiving treatment   List of dedicated fax numbers for the Facilities:  [address] [place] [address] DENIALS (Administrative/Medical Necessity) [phone]   [address] (Maternity/NICU/Pediatrics) [phone]   [address] [place] [address]  [address] [place] [address] [place] [address] Chrissy Meng [phone] [facility] [phone]

## 2022-08-11 NOTE — ASSESSMENT & PLAN NOTE
Acute worsening in the setting of obstructive pyelonephritis  Baseline creatinine approximate 1 4-1 5 over the last year @ [facility] - presented @ 2 56 -> now 1 5  Anticipate improvement w/ management of sepsis and genitourinary decompression/stenting   Improved with IV fluids  Creatinine down trending  Restart back on the cozaar  at the time of discharge  BMP in a week as outpatient

## 2022-08-11 NOTE — PROGRESS NOTES
[facility]  Progress Note - Julia Zhu 1965, 56 y o  male MRN: [id number]  Unit/Bed#: [facility] 821-01 Encounter: [id number]  Primary Care Provider: Latricia Briceno MD   Date and time admitted to hospital: 8/8/2022  5:23 PM    * Sepsis on admission  Assessment & Plan  Presents with a high-grade fever coupled w/ tachycardia/leukocytosis, along with lactic acidosis and elevated procalcitonin (trend until peak)  In the setting of obstructive pyelonephritis (see below) -> urine cultures from before grew E Coli, BC 1/2 growing EColi too  Pansensitive E coli    Can transition to p o  antibiotics at the time of discharge to finish a total of 10-14 day course     E coli bacteremia  Assessment & Plan  · Antibiotics follow-up on the final culture    · Pansensitive E coli-transition to p o  antibiotics at the time of discharge to finish a total of 10- 14  day course  · Repeat cultures so far negative    Hyponatremia  Assessment & Plan  Mildly diminished at 132 - trialed on IV fluids in the setting of severe sepsis  resolved    Acute kidney injury superimposed on chronic kidney disease stage 3   Assessment & Plan  Acute worsening in the setting of obstructive pyelonephritis  Baseline creatinine approximate 1 4-1 5 over the last year @ [facility] - presented @ 2 56 -> now 1 5  Anticipate improvement w/ management of sepsis and genitourinary decompression/stenting   Improved with IV fluids  Creatinine down trending  Restart back on the cozaar  at the time of discharge  BMP in a week as outpatient    Morbid obesity  Assessment & Plan  BMI of 41 21  Lifestyle/diet modifications    Diabetes mellitus type 2   Assessment & Plan  Lab Results   Component Value Date    HGBA1C 5 7 02/10/2022     Hold oral hypoglycemics while hospitalized  Initiate SSI coverage per Accu-Cheks  Aggressive blood sugar control in the setting of sepsis  Carbohydrate restricted diet  Restart back on the outpatient medication at the time of discharge    Essential hypertension  Assessment & Plan  Optimize pain control  Continue Norvasc - held Cozaar preoperatively and in the setting of acute kidney injury (if renal function stable, can resume over the next few days) - PRN IV Hydralazine on board for BP spikes  Low-sodium diet    Obstructive pyelonephritis  Assessment & Plan  CT imaging on 8/6 revealed: "Moderately obstructing 5 x 4 x 3 mm mid left ureteric calculus located at the level of the superior endplate of the L4 vertebral body  This has migrated from the lower pole of the left kidney  Lajean Cassette Lajean Cassette Additional nonobstructing bilateral renal calyceal calculi  Lajean Cassette Lajean Cassette Abnormal appearance of the urinary bladder, new from the prior study  There is moderate right-sided hydroureter and mild left-sided hydroureter without obstructing distal ureteric calculi  Correlate for ureteral obstruction secondary to cystitis versus bladder outlet obstruction from prostatic enlargement  Lajean Cassette Lajean Cassette Mild abnormal appearance of the prostate gland  Correlate for acute prostatitis "  Urgently taken to the OR from the ED w/ plan for cystoscopy with retrograde pyelogram and bilateral ureteral stenting in the setting of sepsis  Continue IV fluids and IV Abx  PRN pain/emesis control  Monitor renal function  Further management and postoperative recommendations per primary service (urology) - initiated on Flomax        VTE Pharmacologic Prophylaxis:   Heparin    Patient Centered Rounds: I performed bedside rounds with nursing staff today  Discussions with Specialists or Other Care Team Provider:  Urology    Education and Discussions with Family / Patient: Updated patient  Time Spent for Care: 30 minutes  More than 50% of total time spent on counseling and coordination of care as described above      Current Length of Stay: 3 day(s)  Current Patient Status: Inpatient   Certification Statement:  Likely DC today   Discharge Plan: Anticipate discharge later today or tomorrow to home  Code Status: Level 1 - Full Code    Subjective:   Patient seen and examined  Still with pain when he urinates  Discussed with Neurology  Improving creatinine no fever  Cultures are back okay for discharge from medical standpoint with outpatient antibiotic    Objective:     Vitals:   Temp (24hrs), Av 5 °F (37 5 °C), Min:99 °F (37 2 °C), Max:100 4 °F (38 °C)    Temp:  [99 °F (37 2 °C)-100 4 °F (38 °C)] 99 °F (37 2 °C)  HR:  [66-83] 66  Resp:  [16-18] 16  BP: (112-129)/(69-84) 129/84  SpO2:  [96 %-97 %] 97 %  Body mass index is 41 05 kg/m²  Input and Output Summary (last 24 hours): Intake/Output Summary (Last 24 hours) at 2022 1247  Last data filed at 2022 1143  Gross per 24 hour   Intake 3048 33 ml   Output 6000 ml   Net -2951 67 ml       Physical Exam:   Physical Exam  Constitutional:       Appearance: Normal appearance  He is obese  HENT:      Head: Normocephalic  Nose: Nose normal    Eyes:      General: No scleral icterus  Cardiovascular:      Rate and Rhythm: Normal rate and regular rhythm  Pulses: Normal pulses  Pulmonary:      Breath sounds: Normal breath sounds  Abdominal:      General: Bowel sounds are normal       Comments: No focal tenderness   Musculoskeletal:         General: Normal range of motion  Cervical back: Normal range of motion and neck supple  Skin:     General: Skin is warm  Neurological:      General: No focal deficit present  Mental Status: He is alert            Additional Data:     Labs:  Results from last 7 days   Lab Units 22  0434 08/10/22  0534   WBC Thousand/uL 12 11* 16 28*   HEMOGLOBIN g/dL 13 8 13 9   HEMATOCRIT % 42 4 43 6   PLATELETS Thousands/uL 248 248   BANDS PCT % 4  --    NEUTROS PCT %  --  85*   LYMPHS PCT %  --  7*   LYMPHO PCT % 18  --    MONOS PCT %  --  6   MONO PCT % 5  --    EOS PCT % 3 0     Results from last 7 days   Lab Units 22  0434 22  0612 22  1750   SODIUM mmol/L 140   < > 132*   POTASSIUM mmol/L 3 5   < > 3 8   CHLORIDE mmol/L 107   < > 100   CO2 mmol/L 25   < > 25   BUN mg/dL 26*   < > 31*   CREATININE mg/dL 1 58*   < > 2 56*   ANION GAP mmol/L 8   < > 7   CALCIUM mg/dL 9 3   < > 9 5   ALBUMIN g/dL  --   --  2 9*   TOTAL BILIRUBIN mg/dL  --   --  1 20*   ALK PHOS U/L  --   --  122*   ALT U/L  --   --  35   AST U/L  --   --  27   GLUCOSE RANDOM mg/dL 114   < > 171*    < > = values in this interval not displayed  Results from last 7 days   Lab Units 08/08/22  1750   INR  1 11     Results from last 7 days   Lab Units 08/11/22  1104 08/10/22  2058 08/10/22  1616 08/10/22  1049 08/10/22  0712 08/09/22  2054 08/09/22  1557 08/09/22  1100 08/09/22  0612 08/08/22 2051 08/08/22  1958   POC GLUCOSE mg/dl 127 140 130 172* 131 155* 155* 230* 240* 152* 146*         Results from last 7 days   Lab Units 08/09/22  0612 08/08/22  2155 08/08/22  1750   LACTIC ACID mmol/L 1 3 0 9 2 6*   PROCALCITONIN ng/ml 5 55*  --  8 30*       Lines/Drains:  Invasive Devices  Report    Peripheral Intravenous Line  Duration           Peripheral IV 08/09/22 Left;Ventral (anterior) Forearm 2 days                      Imaging:  No new imaging    Recent Cultures (last 7 days):   Results from last 7 days   Lab Units 08/09/22  1658 08/08/22  1912 08/08/22  1750 08/08/22  1735 08/06/22  0955   BLOOD CULTURE  No Growth at 24 hrs    No Growth at 24 hrs   --  Escherichia coli*  No Growth at 48 hrs   --   --    GRAM STAIN RESULT   --   --  Gram negative rods*  --   --    URINE CULTURE   --  >100,000 cfu/ml Escherichia coli*  --  >100,000 cfu/ml Escherichia coli* >100,000 cfu/ml Escherichia coli*       Last 24 Hours Medication List:   Current Facility-Administered Medications   Medication Dose Route Frequency Provider Last Rate    acetaminophen  650 mg Oral Q6H PRN Deanna Gallo MD      amLODIPine  10 mg Oral Daily Deanna Gallo MD      cefTRIAXone  2,000 mg Intravenous Q24H Jona Ely MD 2,000 mg (08/10/22 2144)    heparin (porcine)  5,000 Units Subcutaneous formerly [facility] Alma Rosa Mckeon MD      hydrALAZINE  5 mg Intravenous Q6H PRN Mahad Bell MD      HYDROmorphone  0 2 mg Intravenous Q3H PRN Alma Rosa Mckeon MD      insulin lispro  2-12 Units Subcutaneous 4x Daily (AC & HS) Mahad Bell MD      ondansetron  4 mg Intravenous Q4H PRN Mahad Bell MD      oxybutynin  5 mg Oral BID Nadege Ramos PA-C      oxyCODONE  5 mg Oral Q4H PRN Alma Rosa Mckeon MD      sodium chloride  100 mL/hr Intravenous Continuous Chapito Dumont  mL/hr (08/11/22 1136)    tamsulosin  0 4 mg Oral Daily With Stewart Santillan MD          Today, Patient Was Seen By: Mallory Ann MD    **Please Note: This note may have been constructed using a voice recognition system  **

## 2022-08-11 NOTE — DISCHARGE INSTRUCTIONS
You have bilateral ureteral stents in place  You will experience intermittent blood in your urine  Do not be too alarmed, if this occurs  Stay well hydrated  This is normal to occur with stents  He will also experience some burning with urination and bladder spasms  Flomax and oxybutynin are medications that should help and assist with this discomfort  We will continue with scheduled follow-up  And we will contact you with any additional changes

## 2022-08-11 NOTE — TELEPHONE ENCOUNTER
Patient status post bilateral ureteral stent insertion for left ureteral calculus, right hydronephrosis most likely due to inflammation  With septic stone and bacteremia  Patient scheduled for ureteroscopy 09/16/2022  He will require left ureteroscopy and will leave it up to Dr Jasmine Fairly for stent exchange on the right versus stent removal     Please have surgical team discussed with Dr Jasmine Fairly once he has returned  To see if he needs to adjust plan      Thank you

## 2022-08-11 NOTE — DISCHARGE SUMMARY
Discharge Summary - Abdiel Estrada 56 y o  male MRN: [id number]    Unit/Bed#: [facility] 821-01 Encounter: [id number]    Admission Date:   Admission Orders (From admission, onward)     Ordered        08/08/22 1833  INPATIENT ADMISSION  Once                        Admitting Diagnosis: Flank pain [R10 9]    HPI:   Abdiel Estrada is a 56 y o  male with a history of gross hematuria who underwent hematuria workup in May of 2022  Cystoscopy was unremarkable and CT demonstrated right Bosniak 1 cysts as well as bilateral left>right renal stones  He was scheduled for left ureteroscopy in September  The patient called the office stating that he started to have blood in his urine and flank pain  Stat CT scan was performed 8/6/2022 demonstrating interval passage of a stone into the left ureter  Medical history significant for diabetes, hypertension, and gout  Procedures Performed:  Cystoscopy retrograde pyelogram bilateral ureteral stent insertion with Dr Kit Prince    Summary of Hospital Course:   Patient presented to the emergency room on 08/08 after being directed by the urology office for evaluation and surgical intervention  CT scan on outpatient revealed moderately obstructing 5 x 4 mm mid left ureteral calculus  Additional nonobstructing bilateral renal calculi  Abnormal appearance of urinary bladder  There is moderate right-sided hydroureter and mild left-sided hydroureter without obstructing distal calculi  Correlate for ureteral obstruction secondary to cystitis versus bladder outlet obstruction  Along with BURTON, leukocytosis and febrile  Patient then underwent cystoscopy retrograde pyelogram bilateral ureteral stent insertion with Dr Kit Prince  Discovered to have purulent urine from bilateral stents  Internal medicine consult and thereafter for management of sepsis and patient's additional chronic  Patient's blood cultures were obtained which revealed were positive for E coli along with urine culture  Patient had extended stay due to need for antibiotics in the setting of bacteremia  Cultures came back positive with pansensitive E coli, patient had been afebrile and vitals were stable  Olmedo catheter was removed during admission after patient had been afebrile for 24 hours  Repeat blood cultures came back negative  BURTON resolved and creatinine back at baseline, leukocytosis continue to trend downward  Patient was feeling significantly better  And patient was stable from a  surgical standpoint and from medicine standpoint  Patient has plan for 2nd definitive stone treatment on outpatient scheduled  Urology will continue to follow  Recommend outpatient BMP with primary care physician  Significant Findings, Care, Treatment and Services Provided:  Bacteremia, internal Medicine consult    Complications:  None      Discharge Diagnosis:  Sepsis in the setting of obstructing ureteral calculi, cystitis and bacteremia  Medical problem solved:  -hydronephrosis  -bacteremia  -BURTON    Condition at Discharge: stable       Discharge instructions/Information to patient and family:   See after visit summary for information provided to patient and family  Provisions for Follow-Up Care:  See after visit summary for information related to follow-up care and any pertinent home health orders  PCP: Monie Scanlon MD    Disposition: Home    Planned Readmission: No      Discharge Statement   I spent 20 minutes discharging the patient  This time was spent on the day of discharge  I had direct contact with the patient on the day of discharge  Additional documentation is required if more than 30 minutes were spent on discharge  Discharge Medications:  See after visit summary for reconciled discharge medications provided to patient and family         Janet Lei PA-C

## 2022-08-11 NOTE — RESTORATIVE TECHNICIAN NOTE
Restorative Technician Note      Patient Name: Gurdeep Vilchis     Restorative Tech Visit Date: 8/11/2022  Note Type: Mobility  Patient Position Upon Consult: Seated edge of bed  Activity Performed: Ambulated  Assistive Device: Standard walker; Other (Comment) (Attempted with IV pole for stability (pt stated he is indep with SPC), however progressed to RW with better outcome  Chair follow required  PT, Hermila Ponce , notified of pts mobility at this time and will follow up  RN aware as well)  Education Provided: Yes  Patient Position at End of Consult: Bedside chair; All needs within reach;  Other (comment) (Pt informed to call staff for assistance when mobilizing for safety)    Raymond Gomez  DPT, Restorative Technician

## 2022-08-11 NOTE — PLAN OF CARE
Problem: GENITOURINARY - ADULT  Goal: Maintains or returns to baseline urinary function  Description: INTERVENTIONS:  - Assess urinary function  - Encourage oral fluids to ensure adequate hydration if ordered  - Administer IV fluids as ordered to ensure adequate hydration  - Administer ordered medications as needed  - Offer frequent toileting  - Follow urinary retention protocol if ordered  Outcome: Progressing  Goal: Urinary catheter remains patent  Description: INTERVENTIONS:  - Assess patency of urinary catheter  - If patient has a chronic lopez, consider changing catheter if non-functioning  - Follow guidelines for intermittent irrigation of non-functioning urinary catheter  Outcome: Progressing     Problem: PAIN - ADULT  Goal: Verbalizes/displays adequate comfort level or baseline comfort level  Description: Interventions:  - Encourage patient to monitor pain and request assistance  - Assess pain using appropriate pain scale  - Administer analgesics based on type and severity of pain and evaluate response  - Implement non-pharmacological measures as appropriate and evaluate response  - Consider cultural and social influences on pain and pain management  - Notify physician/advanced practitioner if interventions unsuccessful or patient reports new pain  Outcome: Progressing     Problem: INFECTION - ADULT  Goal: Absence or prevention of progression during hospitalization  Description: INTERVENTIONS:  - Assess and monitor for signs and symptoms of infection  - Monitor lab/diagnostic results  - Monitor all insertion sites, i e  indwelling lines, tubes, and drains  - Monitor endotracheal if appropriate and nasal secretions for changes in amount and color  - [place] appropriate cooling/warming therapies per order  - Administer medications as ordered  - Instruct and encourage patient and family to use good hand hygiene technique  - Identify and instruct in appropriate isolation precautions for identified infection/condition  Outcome: Progressing     Problem: DISCHARGE PLANNING  Goal: Discharge to home or other facility with appropriate resources  Description: INTERVENTIONS:  - Identify barriers to discharge w/patient and caregiver  - Arrange for needed discharge resources and transportation as appropriate  - Identify discharge learning needs (meds, wound care, etc )  - Arrange for interpretive services to assist at discharge as needed  - Refer to Case Management Department for coordinating discharge planning if the patient needs post-hospital services based on physician/advanced practitioner order or complex needs related to functional status, cognitive ability, or social support system  Outcome: Progressing     Problem: MOBILITY - ADULT  Goal: Maintain or return to baseline ADL function  Description: INTERVENTIONS:  -  Assess patient's ability to carry out ADLs; assess patient's baseline for ADL function and identify physical deficits which impact ability to perform ADLs (bathing, care of mouth/teeth, toileting, grooming, dressing, etc )  - Assess/evaluate cause of self-care deficits   - Assess range of motion  - Assess patient's mobility; develop plan if impaired  - Assess patient's need for assistive devices and provide as appropriate  - Encourage maximum independence but intervene and supervise when necessary  - Involve family in performance of ADLs  - Assess for home care needs following discharge   - Consider OT consult to assist with ADL evaluation and planning for discharge  - Provide patient education as appropriate  Outcome: Progressing  Goal: Maintains/Returns to pre admission functional level  Description: INTERVENTIONS:  - Perform BMAT or MOVE assessment daily    - Set and communicate daily mobility goal to care team and patient/family/caregiver     - Collaborate with rehabilitation services on mobility goals if consulted  - Ambulate patient 3 times a day  - Out of bed for toileting  - Record patient progress and toleration of activity level   Outcome: Progressing     Problem: Potential for Falls  Goal: Patient will remain free of falls  Description: INTERVENTIONS:  - Educate patient/family on patient safety including physical limitations  - Instruct patient to call for assistance with activity   - Consult OT/PT to assist with strengthening/mobility   - Keep Call bell within reach  - Keep bed low and locked with side rails adjusted as appropriate  - Keep care items and personal belongings within reach  - Initiate and maintain comfort rounds  - Consider moving patient to room near nurses station  Outcome: Progressing

## 2022-08-11 NOTE — ASSESSMENT & PLAN NOTE
CT imaging on 8/6 revealed: "Moderately obstructing 5 x 4 x 3 mm mid left ureteric calculus located at the level of the superior endplate of the L4 vertebral body  This has migrated from the lower pole of the left kidney  Cathlean Gene Cathlean Gene Additional nonobstructing bilateral renal calyceal calculi  Cathlean Gene Cathlean Gene Abnormal appearance of the urinary bladder, new from the prior study  There is moderate right-sided hydroureter and mild left-sided hydroureter without obstructing distal ureteric calculi  Correlate for ureteral obstruction secondary to cystitis versus bladder outlet obstruction from prostatic enlargement  Cathlean Gene Cathlean Gene Mild abnormal appearance of the prostate gland    Correlate for acute prostatitis "  Urgently taken to the OR from the ED w/ plan for cystoscopy with retrograde pyelogram and bilateral ureteral stenting in the setting of sepsis  Was on IV fluids and IV antibiotics  Further management and postoperative recommendations per primary service (urology) - initiated on Flomax

## 2022-08-11 NOTE — PROGRESS NOTES
Progress Note - urology  Mapleton Krystal 56 y o  male MRN: [id number]  Unit/Bed#: [facility] 821-01 Encounter: [id number]    Assessment & Plan:    Bilateral hydronephrosis with sepsis:  -CT scan on admission revealed moderately obstructing 5 x 4 x 3 mm mid left ureteral calculus located at the level of the superior endplate of the L4 vertebrae in body  Additional nonobstructing bilateral renal calyceal calculi  Abnormal appearance urinary bladder new from prior study  There is moderate right-sided hydroureteronephrosis left hydroureter without obstructing distal ureteral calculi  Correlate for ureteral obstruction secondary to cystitis versus bladder outlet obstruction from prostatic enlargement  Mild abnormal appearance of prostate gland correlate for acute prostatitis  -status post cystoscopy bilateral retrograde pyelogram and bilateral ureteral stent insertion  -normal urethral trauma trilobar prostatic enlargement, orthotropic ureteral orifice ease with marked overlying erythema of the trigone and base the bladder, diffuse cystitis changes in the bladder  Purulence from bilateral right and left kidneys upon placement of wires  -urine culture, blood cultures all returning back positive for E coli, pansensitive  Repeat blood cultures reveal no growth after 24 hours  -IV fluids and antibiotics     Plan to discharge patient with 10-14 days of oral antibiotic based off of cultures  -BURTON on admission 2 56->2 3-->1 8---> 1 58 today back at patient's baseline  1 4-1 5 last year    Will restart Cozaar on discharge  -lactic acidosis resolved  -leukocytosis of 12 today  -Olmedo catheter removed, patient voiding on his own      Type 2 diabetes mellitus:  -hemoglobin A1c 5 7  -sliding scale insulin,  -Medicine assisting with management      Essential hypertension:  -Norvasc and losartan, Norvasc continued, losartan on hold due to BURTON  -Medicine assisting with management    Patient clear from surgical and medical standpoint for discharge  Subjective/Objective   Chief Complaint:  None    Subjective:   Patient currently reporting that he is feeling much better compared to yesterday  He reports he still has discomfort/pain with urination  Most likely related to ureteral stents  Objective:     Blood pressure 129/84, pulse 66, temperature 99 °F (37 2 °C), resp  rate 16, height 5' 8" (1 727 m), weight 122 kg (270 lb), SpO2 97 %  ,Body mass index is 41 05 kg/m²  Intake/Output Summary (Last 24 hours) at 8/11/2022 1346  Last data filed at 8/11/2022 1319  Gross per 24 hour   Intake 3268 33 ml   Output 4700 ml   Net -1431 67 ml       Invasive Devices  Report    Peripheral Intravenous Line  Duration           Peripheral IV 08/09/22 Left;Ventral (anterior) Forearm 2 days              Physical Exam  Constitutional:       General: He is not in acute distress  Appearance: He is normal weight  He is not ill-appearing, toxic-appearing or diaphoretic  HENT:      Head: Normocephalic and atraumatic  Right Ear: External ear normal       Left Ear: External ear normal       Nose: Nose normal       Mouth/Throat:      Pharynx: Oropharynx is clear  Eyes:      General: No scleral icterus  Conjunctiva/sclera: Conjunctivae normal    Cardiovascular:      Rate and Rhythm: Normal rate and regular rhythm  Pulses: Normal pulses  Heart sounds: No murmur heard  No friction rub  No gallop  Pulmonary:      Effort: Pulmonary effort is normal  No respiratory distress  Breath sounds: No wheezing, rhonchi or rales  Abdominal:      General: Bowel sounds are normal  There is no distension  Tenderness: There is no abdominal tenderness  Musculoskeletal:         General: Normal range of motion  Cervical back: Normal range of motion  Skin:     General: Skin is warm and dry  Neurological:      General: No focal deficit present  Mental Status: He is alert and oriented to person, place, and time  Psychiatric:         Mood and Affect: Mood normal          Behavior: Behavior normal          Thought Content: Thought content normal          Judgment: Judgment normal          Lab, Imaging and other studies:I have personally reviewed pertinent lab results      Lab Results   Component Value Date    WBC 12 11 (H) 08/11/2022    HGB 13 8 08/11/2022    HCT 42 4 08/11/2022    MCV 89 08/11/2022     08/11/2022     Lab Results   Component Value Date    SODIUM 140 08/11/2022    K 3 5 08/11/2022     08/11/2022    CO2 25 08/11/2022    BUN 26 (H) 08/11/2022    CREATININE 1 58 (H) 08/11/2022    GLUC 114 08/11/2022    CALCIUM 9 3 08/11/2022       VTE Pharmacologic Prophylaxis: Heparin  VTE Mechanical Prophylaxis: sequential compression device      Ruchi SOCRATES Edouard

## 2022-08-11 NOTE — PLAN OF CARE
Problem: PAIN - ADULT  Goal: Verbalizes/displays adequate comfort level or baseline comfort level  Description: Interventions:  - Encourage patient to monitor pain and request assistance  - Assess pain using appropriate pain scale  - Administer analgesics based on type and severity of pain and evaluate response  - Implement non-pharmacological measures as appropriate and evaluate response  - Consider cultural and social influences on pain and pain management  - Notify physician/advanced practitioner if interventions unsuccessful or patient reports new pain  Outcome: Progressing     Problem: INFECTION - ADULT  Goal: Absence or prevention of progression during hospitalization  Description: INTERVENTIONS:  - Assess and monitor for signs and symptoms of infection  - Monitor lab/diagnostic results  - Monitor all insertion sites, i e  indwelling lines, tubes, and drains  - Monitor endotracheal if appropriate and nasal secretions for changes in amount and color  - [place] appropriate cooling/warming therapies per order  - Administer medications as ordered  - Instruct and encourage patient and family to use good hand hygiene technique  - Identify and instruct in appropriate isolation precautions for identified infection/condition  Outcome: Progressing

## 2022-08-11 NOTE — ASSESSMENT & PLAN NOTE
· Antibiotics follow-up on the final culture    · Pansensitive E coli-transition to p o  antibiotics at the time of discharge to finish a total of 10- 14  day course  · Repeat cultures so far negative

## 2022-08-12 ENCOUNTER — TELEPHONE (OUTPATIENT)
Dept: OTHER | Facility: OTHER | Age: 57
End: 2022-08-12

## 2022-08-12 ENCOUNTER — NURSE TRIAGE (OUTPATIENT)
Dept: OTHER | Facility: OTHER | Age: 57
End: 2022-08-12

## 2022-08-12 NOTE — TELEPHONE ENCOUNTER
Patient scheduled for definitive ureteroscopy on September 16  He may require an H&P visit prior to this

## 2022-08-12 NOTE — UTILIZATION REVIEW
Notification of Discharge   This is a Notification of Discharge from our facility [address]  Please be advised that this patient has been discharge from our facility  Below you will find the admission and discharge date and time including the patients disposition  UTILIZATION REVIEW CONTACT:  Marcos [facility]  Utilization   Network Utilization Review Department  Phone: [phone] x carefully listen to the prompts  All voicemails are confidential   Email: [email or web address]     PHYSICIAN ADVISORY SERVICES:  FOR [id number] REVIEW - MEDICAL NECESSITY DENIAL  Phone: [phone]  Fax: [phone]  Email: [email or web address]     PRESENTATION DATE: 8/8/2022  5:23 PM  OBERVATION ADMISSION DATE:   INPATIENT ADMISSION DATE: 8/8/22  6:13 PM   DISCHARGE DATE: 8/11/2022  9:50 PM  DISPOSITION: Home/Self Care Home/Self Care      IMPORTANT INFORMATION:  Send all requests for admission clinical reviews, approved or denied determinations and any other requests to dedicated fax number below belonging to the campus where the patient is receiving treatment   List of dedicated fax numbers:  [address] DENIALS (Administrative/Medical Necessity) [phone]   [address] (Maternity/NICU/Pediatrics) [phone]   Frandymichelle Yo [phone]   [facility] [phone]   [address] [place] [address] [phone]   2000 [facility] [address] [place] [address],[address] [phone]   [facility]  [phone]   2205 [facility] [place], [address]  2401 [facility] 1000 [address] [facility] [phone]

## 2022-08-12 NOTE — TELEPHONE ENCOUNTER
Patient calling in stating that he feels "sluggish" and has mild lightheadedness when standing up to walk  He states that since he got home from the hospital yesterday he has not been up much and just started noticing this today  He also states he has not ate or drank much today so far  He denies any hematuria or increased pain  Advised patient to rest and hydrate as well as try to eat something mild to start  Patient verbalized understanding  Please follow up with patient if you would like him to be seen in office or UC

## 2022-08-12 NOTE — TELEPHONE ENCOUNTER
Regarding: dizzy post op 8/8  ----- Message from Sally Garcia RN sent at 8/12/2022  2:37 PM EDT -----  S/p sx 8/8  Reports still feeling dizzy

## 2022-08-12 NOTE — TELEPHONE ENCOUNTER
Patient is s/p cysto retrograde pyelogram bilateral ureteral stent placement with Dr Amira Larsen 8/8/22    Please advise

## 2022-08-12 NOTE — TELEPHONE ENCOUNTER
Reason for Disposition   MILD dizziness (e g , walking normally) AND has NOT been evaluated by physician for this (Exception: dizziness caused by heat exposure, sudden standing, or poor fluid intake)    Answer Assessment - Initial Assessment Questions  1  DESCRIPTION: "Describe your dizziness "      "sluggish" from anesthesia     2  LIGHTHEADED: "Do you feel lightheaded?" (e g , somewhat faint, woozy, weak upon standing)      Slight lightheaded when he stands up to walk, lasts for a couple seconds     3  VERTIGO: "Do you feel like either you or the room is spinning or tilting?" (i e  vertigo)      No spinning     4  SEVERITY: "How bad is it?"  "Do you feel like you are going to faint?" "Can you stand and walk?"    - MILD: Feels slightly dizzy, but walking normally  - MODERATE: Feels very unsteady when walking, but not falling; interferes with normal activities (e g , school, work)   - SEVERE: Unable to walk without falling, or requires assistance to walk without falling; feels like passing out now  Mild- sensitivity to light, sluggish and lightheaded for a few seconds when standing up     5  ONSET:  "When did the dizziness begin?"      Started today where he noticed it     6  AGGRAVATING FACTORS: "Does anything make it worse?" (e g , standing, change in head position)     Nothing seems to make it worse     7  HEART RATE: "Can you tell me your heart rate?" "How many beats in 15 seconds?"  (Note: not all patients can do this)        Feels normal to patient     8  CAUSE: "What do you think is causing the dizziness?"     Unsure     9  RECURRENT SYMPTOM: "Have you had dizziness before?" If Yes, ask: "When was the last time?" "What happened that time?"      No     10   OTHER SYMPTOMS: "Do you have any other symptoms?" (e g , fever, chest pain, vomiting, diarrhea, bleeding)        Denies    Protocols used: MXRGETOLU-XGYXA-[id number]

## 2022-08-13 LAB — BACTERIA BLD CULT: NORMAL

## 2022-08-14 LAB
BACTERIA BLD CULT: NORMAL
BACTERIA BLD CULT: NORMAL

## 2022-08-15 NOTE — TELEPHONE ENCOUNTER
Post Op Note    Froy Stehpens is a 56 y o  male s/p CYSTOSCOPY RETROGRADE PYELOGRAM WITH INSERTION STENT URETERAL (Bilateral Bladder) performed 8/8/22  Froy Stephens is a patient of Dr Kam Lew and is seen at the [facility] office, but Dr Lowell Chirinos performed recent procedure  Called and spoke with patient at this time  In a different encounter he reported dizziness after discharge  This has since subsided and he states it lasted longer than he thought it should/ Reviewed he did have surgery and an infection present  Reviewed sometimes anesthesia can affect people longer than expected  Reviewed plan for second/definitive surgery is for 9/16  Reviewed he does need lab work and urine culture done prior  Advised he can go three weeks prior to ensure infection resolved and to give office enough time to treat him appropriately if infection has not resolved  He was agreeable to that plan  Advised he also refer to surgical packet he should have received in the mail  Reviewed stent discomfort measures as well as medications he was prescribed on d/c  He verbalized understanding of conversation

## 2022-08-15 NOTE — TELEPHONE ENCOUNTER
Plan per Dr Pickard's OP note:  Plan:    - Citlalli Cord to urology with medicine consult  - maintain lopez catheter until afebrile 24 hours  - broad spectrum abx, tailor to cultures   - definitve stone surgery is already scheduled for 9/16/2022  Patient will need repeat urine culture closer to and prior to surgery

## 2022-08-15 NOTE — TELEPHONE ENCOUNTER
Spoke with Fidelia Champion and she advised he will not need an h&P or f/u from 1st surgery    Surgery is scheduled with DV , spoke with pt  , he would like the surgery scheduler to go over the next surgery , ty

## 2022-08-26 ENCOUNTER — TELEPHONE (OUTPATIENT)
Dept: UROLOGY | Facility: MEDICAL CENTER | Age: 57
End: 2022-08-26

## 2022-08-26 ENCOUNTER — APPOINTMENT (OUTPATIENT)
Dept: LAB | Age: 57
End: 2022-08-26
Payer: COMMERCIAL

## 2022-08-26 DIAGNOSIS — R31.0 GROSS HEMATURIA: ICD-10-CM

## 2022-08-26 DIAGNOSIS — R82.89 ABNORMAL URINE CYTOLOGY: ICD-10-CM

## 2022-08-26 LAB
ABO GROUP BLD: NORMAL
ALBUMIN SERPL BCP-MCNC: 3.4 G/DL (ref 3.5–5)
ALP SERPL-CCNC: 78 U/L (ref 46–116)
ALT SERPL W P-5'-P-CCNC: 32 U/L (ref 12–78)
ANION GAP SERPL CALCULATED.3IONS-SCNC: 7 MMOL/L (ref 4–13)
APTT PPP: 28 SECONDS (ref 23–37)
AST SERPL W P-5'-P-CCNC: 15 U/L (ref 5–45)
BASOPHILS # BLD AUTO: 0.05 THOUSANDS/ΜL (ref 0–0.1)
BASOPHILS NFR BLD AUTO: 1 % (ref 0–1)
BILIRUB SERPL-MCNC: 0.79 MG/DL (ref 0.2–1)
BLD GP AB SCN SERPL QL: NEGATIVE
BUN SERPL-MCNC: 13 MG/DL (ref 5–25)
CALCIUM ALBUM COR SERPL-MCNC: 9.9 MG/DL (ref 8.3–10.1)
CALCIUM SERPL-MCNC: 9.4 MG/DL (ref 8.3–10.1)
CHLORIDE SERPL-SCNC: 109 MMOL/L (ref 96–108)
CO2 SERPL-SCNC: 27 MMOL/L (ref 21–32)
CREAT SERPL-MCNC: 1.53 MG/DL (ref 0.6–1.3)
EOSINOPHIL # BLD AUTO: 0.17 THOUSAND/ΜL (ref 0–0.61)
EOSINOPHIL NFR BLD AUTO: 3 % (ref 0–6)
ERYTHROCYTE [DISTWIDTH] IN BLOOD BY AUTOMATED COUNT: 13.5 % (ref 11.6–15.1)
GFR SERPL CREATININE-BSD FRML MDRD: 50 ML/MIN/1.73SQ M
GLUCOSE P FAST SERPL-MCNC: 110 MG/DL (ref 65–99)
HCT VFR BLD AUTO: 45.2 % (ref 36.5–49.3)
HGB BLD-MCNC: 14.7 G/DL (ref 12–17)
IMM GRANULOCYTES # BLD AUTO: 0.02 THOUSAND/UL (ref 0–0.2)
IMM GRANULOCYTES NFR BLD AUTO: 0 % (ref 0–2)
INR PPP: 0.97 (ref 0.84–1.19)
LYMPHOCYTES # BLD AUTO: 2.15 THOUSANDS/ΜL (ref 0.6–4.47)
LYMPHOCYTES NFR BLD AUTO: 33 % (ref 14–44)
MCH RBC QN AUTO: 29.9 PG (ref 26.8–34.3)
MCHC RBC AUTO-ENTMCNC: 32.5 G/DL (ref 31.4–37.4)
MCV RBC AUTO: 92 FL (ref 82–98)
MONOCYTES # BLD AUTO: 0.45 THOUSAND/ΜL (ref 0.17–1.22)
MONOCYTES NFR BLD AUTO: 7 % (ref 4–12)
NEUTROPHILS # BLD AUTO: 3.72 THOUSANDS/ΜL (ref 1.85–7.62)
NEUTS SEG NFR BLD AUTO: 56 % (ref 43–75)
NRBC BLD AUTO-RTO: 0 /100 WBCS
PLATELET # BLD AUTO: 292 THOUSANDS/UL (ref 149–390)
PMV BLD AUTO: 10.4 FL (ref 8.9–12.7)
POTASSIUM SERPL-SCNC: 4 MMOL/L (ref 3.5–5.3)
PROT SERPL-MCNC: 7.6 G/DL (ref 6.4–8.4)
PROTHROMBIN TIME: 13.1 SECONDS (ref 11.6–14.5)
RBC # BLD AUTO: 4.92 MILLION/UL (ref 3.88–5.62)
RH BLD: POSITIVE
SODIUM SERPL-SCNC: 143 MMOL/L (ref 135–147)
SPECIMEN EXPIRATION DATE: NORMAL
WBC # BLD AUTO: 6.56 THOUSAND/UL (ref 4.31–10.16)

## 2022-08-26 PROCEDURE — 86850 RBC ANTIBODY SCREEN: CPT

## 2022-08-26 PROCEDURE — 87086 URINE CULTURE/COLONY COUNT: CPT

## 2022-08-26 PROCEDURE — 36415 COLL VENOUS BLD VENIPUNCTURE: CPT

## 2022-08-26 PROCEDURE — 85610 PROTHROMBIN TIME: CPT

## 2022-08-26 PROCEDURE — 86901 BLOOD TYPING SEROLOGIC RH(D): CPT

## 2022-08-26 PROCEDURE — 86900 BLOOD TYPING SEROLOGIC ABO: CPT

## 2022-08-26 PROCEDURE — 85025 COMPLETE CBC W/AUTO DIFF WBC: CPT

## 2022-08-26 PROCEDURE — 80053 COMPREHEN METABOLIC PANEL: CPT

## 2022-08-26 PROCEDURE — 85730 THROMBOPLASTIN TIME PARTIAL: CPT

## 2022-08-27 LAB — BACTERIA UR CULT: NORMAL

## 2022-09-08 ENCOUNTER — TELEPHONE (OUTPATIENT)
Dept: OTHER | Facility: OTHER | Age: 57
End: 2022-09-08

## 2022-09-08 DIAGNOSIS — R31.0 GROSS HEMATURIA: Primary | ICD-10-CM

## 2022-09-08 NOTE — TELEPHONE ENCOUNTER
Patient complains of urine switching between pink then yellow and then at time dark tea colored  Advised patient with the stones and the stent pink urine is to be expected  Yellow is normal  The dark urine is concern for dehydration which patient admits he works and is not drinking nearly as much as he should  Advised patient to increase hydration and that I would like him to go get urine testing just to rule out infection  Patient agreeable  He wanted to wait til Monday but advised if he does have infection I would want us to have enough time treat him prior to his upcoming procedure  Patient verbalized understanding and will go this evening or tomorrow

## 2022-09-08 NOTE — TELEPHONE ENCOUNTER
Pt  Said he Has been urinating 3 different colors  He's not sure what is going go  Sometimes it's a lite pink, or a deep color  He is having a procedure next week and wanted to know if you wanted to see him before the procedure  Please return his call

## 2022-09-09 NOTE — TELEPHONE ENCOUNTER
Patient with stent in place, second stage procedure scheduled for 9/16/22  Pre op urine culture done 8/26/22 and was negative  Repeat urine testing not yet completed  Will continue to monitor

## 2022-09-13 DIAGNOSIS — N20.1 URETERAL CALCULUS: Primary | ICD-10-CM

## 2022-09-13 PROBLEM — Z46.6 ENCOUNTER FOR ADJUSTMENT OF URETERAL STENT: Status: ACTIVE | Noted: 2022-09-13

## 2022-09-13 PROCEDURE — NC001 PR NO CHARGE: Performed by: UROLOGY

## 2022-09-13 NOTE — TELEPHONE ENCOUNTER
Called Epi back and notified him that he needs to get urine studies prior to surgery  He states that over the week end he passed a passed a stone in the toilet  He is schedued for surgery 9/16    Please advise of testing needed

## 2022-09-13 NOTE — H&P
H&P Exam - Urology   Harper Wild 56 y o  male MRN: [id number]  Unit/Bed#:  Encounter: [id number]    Assessment/Plan     Assessment:  56year old man with left ureteral stone burden status post left ureteral stent placement, also status post right ureteral stent placement for complicated UTI with mild hydronephrosis noted on CT scan  He is here today for left ureteroscopy with laser lithotripsy as well as right ureteral stent removal and possible right ureteroscopy  Also here today for possible right diagnostic ureteroscopy  Plan:  Proceed to cystoscopy with bilateral retrograde pyelography, possible bilateral ureteroscopy, and left ureteroscopy with laser lithotripsy with all indicated procedures  History of Present Illness   HPI:  Harper Wild is a 56 y o  male who presents with gross hematuria, left ureteral stone status post stenting, also status post right ureteral stent placement for complicated UTI with bacteremia sometime ago  Changes in his state of health include no significant changes since he was last seen    The following portions of the patient's history were reviewed and updated as appropriate: allergies, current medications, past family history, past medical history, past social history, past surgical history and problem list     Review of Systems   Constitutional: Negative  HENT: Negative  Eyes: Negative  Respiratory: Negative  Cardiovascular: Negative  Gastrointestinal: Negative  Endocrine: Negative  Genitourinary: Negative  Musculoskeletal: Negative  Skin: Negative  Allergic/Immunologic: Negative  Neurological: Negative  Hematological: Negative  Psychiatric/Behavioral: Negative          Historical Information   Past Medical History:   Diagnosis Date    Diabetes mellitus ([address] )     Elevated PSA     Gout     Hypertension     Renal disorder      Past Surgical History:   Procedure Laterality Date    FL RETROGRADE PYELOGRAM 8/8/2022    HIP SURGERY Left     osteonecrosis    KNEE CARTILAGE SURGERY Left     GA CYSTOURETHROSCOPY,URETER CATHETER Bilateral 8/8/2022    Procedure: CYSTOSCOPY RETROGRADE PYELOGRAM WITH INSERTION STENT URETERAL;  Surgeon: Alma Rosa Mckeon MD;  Location: [facility];  Service: Urology    ROTATOR CUFF REPAIR Left      Social History   Social History     Substance and Sexual Activity   Alcohol Use Not Currently     Social History     Substance and Sexual Activity   Drug Use Not Currently     Social History     Tobacco Use   Smoking Status Never Smoker   Smokeless Tobacco Never Used     E-Cigarette/Vaping    E-Cigarette Use Never User      E-Cigarette/Vaping Substances    Nicotine No     THC No     CBD No     Flavoring No     Other No     Unknown No      Family History:   Family History   Problem Relation Age of Onset    No Known Problems Father     Gallbladder disease Mother     Colon cancer Maternal Grandmother     Thyroid disease Sister     Asthma Sister     Gallbladder disease Sister         recent removal of gallbladder    No Known Problems Son        Meds/Allergies   PTA meds:   Cannot display prior to admission medications because the patient has not been admitted in this contact  No Known Allergies    Objective   Vitals: There were no vitals taken for this visit  No intake/output data recorded  Invasive Devices  Report    None                 Physical Exam  Vitals reviewed  Constitutional:       General: He is not in acute distress  Appearance: Normal appearance  He is not ill-appearing, toxic-appearing or diaphoretic  HENT:      Head: Normocephalic and atraumatic  Eyes:      General: No scleral icterus  Right eye: No discharge  Left eye: No discharge  Cardiovascular:      Pulses: Normal pulses  Pulmonary:      Effort: Pulmonary effort is normal    Abdominal:      General: There is no distension  Palpations: There is no mass     Musculoskeletal: General: No swelling  Skin:     General: Skin is warm  Neurological:      General: No focal deficit present  Mental Status: He is alert and oriented to person, place, and time  Cranial Nerves: No cranial nerve deficit  Psychiatric:         Mood and Affect: Mood normal          Behavior: Behavior normal          Thought Content: Thought content normal          Judgment: Judgment normal          Lab Results: I have personally reviewed pertinent reports  Imaging: I have personally reviewed pertinent reports  EKG, Pathology, and Other Studies: I have personally reviewed pertinent reports  VTE Prophylaxis: Sequential compression device Merceda Booty)     Code Status: Prior  Advance Directive and Living Will:      Power of :    POLST:      Counseling / Coordination of Care  Total floor / unit time spent today 15 minutes  Greater than 50% of total time was spent with the patient and / or family counseling and / or coordination of care  A description of the counseling / coordination of care:  Review of the pre, bassam, and postop care for today's case

## 2022-09-13 NOTE — DISCHARGE INSTRUCTIONS
Epi,    Today you had bilateral retrograde pyelography and right ureteral stent removal with left ureteroscopy with laser lithotripsy and stone removal   You are now stone free  You do have a stent in place, this is taped to the side of the penis  Please use the black thread to remove your stent in 5 days  You will see us back in the office in 3 months  Urgency and frequency of urination along with discomfort in your left kidney is common after surgery like this, blood in the urine is also common along with passage of small clots  The symptoms can be worsened with repetitive motion or riding a riding lawnmower  These will improve as you recover and after your stent is removed  You do have an enlarged prostate, you may require outlet surgery in the form of a resection of blocking prostate tissue in the future should you have worsening troubles urinating  Your complicated urinary tract infection was likely contributed to by your medication, Burton Height  This causes excretion of sugar in the urine and can be a problem in certain patients  You may want to talk your primary care doctor about trying a different class of medications  If you have questions or concerns as you recover, please let me know    Thank you for allowing me to take care of you today,  Dr Edinson Cheema

## 2022-09-13 NOTE — TELEPHONE ENCOUNTER
Called and spoke to patient  Informed patient we 1 a stat CT to rule out ureteral calculi  Patient stated he can go tomorrow  Patient was given central scheduling phone number he will call and schedule appointment for CT tomorrow    We will postpone this message and monitor for results

## 2022-09-14 ENCOUNTER — APPOINTMENT (OUTPATIENT)
Dept: LAB | Facility: CLINIC | Age: 57
End: 2022-09-14
Payer: COMMERCIAL

## 2022-09-14 ENCOUNTER — HOSPITAL ENCOUNTER (OUTPATIENT)
Dept: CT IMAGING | Facility: HOSPITAL | Age: 57
Discharge: HOME | End: 2022-09-14
Payer: COMMERCIAL

## 2022-09-14 DIAGNOSIS — R31.0 GROSS HEMATURIA: ICD-10-CM

## 2022-09-14 DIAGNOSIS — N20.1 URETERAL CALCULUS: ICD-10-CM

## 2022-09-14 LAB
BACTERIA UR QL AUTO: ABNORMAL /HPF
BILIRUB UR QL STRIP: NEGATIVE
CLARITY UR: ABNORMAL
COLOR UR: ABNORMAL
GLUCOSE UR STRIP-MCNC: ABNORMAL MG/DL
HGB UR QL STRIP.AUTO: ABNORMAL
KETONES UR STRIP-MCNC: NEGATIVE MG/DL
LEUKOCYTE ESTERASE UR QL STRIP: ABNORMAL
NITRITE UR QL STRIP: NEGATIVE
NON-SQ EPI CELLS URNS QL MICRO: ABNORMAL /HPF
PH UR STRIP.AUTO: 5.5 [PH]
PROT UR STRIP-MCNC: ABNORMAL MG/DL
RBC #/AREA URNS AUTO: ABNORMAL /HPF
SP GR UR STRIP.AUTO: 1.02 (ref 1–1.03)
UROBILINOGEN UR STRIP-ACNC: <2 MG/DL
WBC #/AREA URNS AUTO: ABNORMAL /HPF

## 2022-09-14 PROCEDURE — 87086 URINE CULTURE/COLONY COUNT: CPT

## 2022-09-14 PROCEDURE — G1004 CDSM NDSC: HCPCS

## 2022-09-14 PROCEDURE — 74176 CT ABD & PELVIS W/O CONTRAST: CPT

## 2022-09-14 PROCEDURE — 81001 URINALYSIS AUTO W/SCOPE: CPT

## 2022-09-14 NOTE — PRE-PROCEDURE INSTRUCTIONS
Pre-Surgery Instructions:   Medication Instructions    amLODIPine (NORVASC) 10 mg tablet Instructed to take per normal schedule including DOS    Colchicine 0 6 MG CAPS Instructed to take per normal schedule including DOS    Farxiga 5 MG TABS Take per normal schedule     febuxostat (ULORIC) 40 mg tablet Instructed to take per normal schedule    fluticasone (FLONASE) 50 mcg/act nasal spray Take per normal schedule     losartan (COZAAR) 100 MG tablet Instructed to take per normal schedule except DOS    oxybutynin (DITROPAN) 5 mg tablet Instructed to take per normal schedule except DOS    Rybelsus 7 MG TABS Instructed to take per normal schedule except DOS    tamsulosin (FLOMAX) 0 4 mg Instructed to take per normal schedule including DOS with sips water    Pre-op medication, and showering instructions with antibacteral soap reviewed  Instructed to avoid all ASA/NSAIDs and OTC Vit/Supp from now until after surgery per anesthesia guidelines  Tylenol ok prn  Pt  Verbalized an understanding of all instructions reviewed and offers no concerns at this time

## 2022-09-15 LAB — BACTERIA UR CULT: NORMAL

## 2022-09-15 NOTE — TELEPHONE ENCOUNTER
Spoke with patient  Advised that Dr Leona Delgado reviewed CT and stone is still present in ureter  Advised, recommendation is to proceed with surgery tomorrow as scheduled  Patient verbalized understanding  Will await call later today from hospital with arrival time

## 2022-09-15 NOTE — TELEPHONE ENCOUNTER
DV reviewed imaging   The stone is still present within the distal left ureter, I recommend that we proceed to surgery as planned for removal of the left ureteral stone and possible right-sided diagnostic ureteroscopy

## 2022-09-16 ENCOUNTER — TELEPHONE (OUTPATIENT)
Dept: UROLOGY | Facility: CLINIC | Age: 57
End: 2022-09-16

## 2022-09-16 ENCOUNTER — APPOINTMENT (OUTPATIENT)
Dept: RADIOLOGY | Facility: AMBULARY SURGERY CENTER | Age: 57
End: 2022-09-16
Payer: COMMERCIAL

## 2022-09-16 ENCOUNTER — HOSPITAL ENCOUNTER (OUTPATIENT)
Facility: AMBULARY SURGERY CENTER | Age: 57
Setting detail: OUTPATIENT SURGERY
Discharge: HOME | End: 2022-09-16
Attending: UROLOGY | Admitting: UROLOGY
Payer: COMMERCIAL

## 2022-09-16 ENCOUNTER — ANESTHESIA (OUTPATIENT)
Dept: PERIOP | Facility: AMBULARY SURGERY CENTER | Age: 57
End: 2022-09-16
Payer: COMMERCIAL

## 2022-09-16 ENCOUNTER — ANESTHESIA EVENT (OUTPATIENT)
Dept: PERIOP | Facility: AMBULARY SURGERY CENTER | Age: 57
End: 2022-09-16
Payer: COMMERCIAL

## 2022-09-16 VITALS
RESPIRATION RATE: 18 BRPM | BODY MASS INDEX: 48.44 KG/M2 | OXYGEN SATURATION: 96 % | HEIGHT: 63 IN | HEART RATE: 68 BPM | WEIGHT: 273.4 LBS | DIASTOLIC BLOOD PRESSURE: 70 MMHG | SYSTOLIC BLOOD PRESSURE: 125 MMHG | TEMPERATURE: 97.4 F

## 2022-09-16 DIAGNOSIS — N20.0 NEPHROLITHIASIS: Primary | ICD-10-CM

## 2022-09-16 PROBLEM — J02.9 ACUTE PHARYNGITIS: Status: ACTIVE | Noted: 2022-09-16

## 2022-09-16 PROBLEM — S61.409A OPEN WOUND OF HAND EXCEPT FINGERS: Status: ACTIVE | Noted: 2022-09-16

## 2022-09-16 PROBLEM — J06.9 ACUTE UPPER RESPIRATORY INFECTION: Status: ACTIVE | Noted: 2022-09-16

## 2022-09-16 PROBLEM — R79.89 ABNORMAL LIVER FUNCTION TEST: Status: ACTIVE | Noted: 2021-10-27

## 2022-09-16 PROBLEM — S60.219A CONTUSION OF WRIST: Status: ACTIVE | Noted: 2021-01-05

## 2022-09-16 PROBLEM — N18.30 STAGE 3 CHRONIC KIDNEY DISEASE (HCC): Status: ACTIVE | Noted: 2021-08-16

## 2022-09-16 PROBLEM — M10.9 GOUT: Status: ACTIVE | Noted: 2021-08-16

## 2022-09-16 LAB
GLUCOSE SERPL-MCNC: 113 MG/DL (ref 65–140)
GLUCOSE SERPL-MCNC: 115 MG/DL (ref 65–140)

## 2022-09-16 PROCEDURE — 74420 UROGRAPHY RTRGR +-KUB: CPT

## 2022-09-16 PROCEDURE — 82948 REAGENT STRIP/BLOOD GLUCOSE: CPT

## 2022-09-16 PROCEDURE — 52356 CYSTO/URETERO W/LITHOTRIPSY: CPT | Performed by: UROLOGY

## 2022-09-16 PROCEDURE — C1769 GUIDE WIRE: HCPCS | Performed by: UROLOGY

## 2022-09-16 PROCEDURE — 82360 CALCULUS ASSAY QUANT: CPT | Performed by: UROLOGY

## 2022-09-16 PROCEDURE — C1758 CATHETER, URETERAL: HCPCS | Performed by: UROLOGY

## 2022-09-16 PROCEDURE — C2617 STENT, NON-COR, TEM W/O DEL: HCPCS | Performed by: UROLOGY

## 2022-09-16 DEVICE — STENT URETERAL 6 FR 26CM INLAY OPTIMA: Type: IMPLANTABLE DEVICE | Site: URETER | Status: FUNCTIONAL

## 2022-09-16 RX ORDER — CEFAZOLIN SODIUM 2 G/50ML
2000 SOLUTION INTRAVENOUS ONCE
Status: DISCONTINUED | OUTPATIENT
Start: 2022-09-16 | End: 2022-09-16

## 2022-09-16 RX ORDER — SODIUM CHLORIDE, SODIUM LACTATE, POTASSIUM CHLORIDE, CALCIUM CHLORIDE 600; 310; 30; 20 MG/100ML; MG/100ML; MG/100ML; MG/100ML
125 INJECTION, SOLUTION INTRAVENOUS CONTINUOUS
Status: DISCONTINUED | OUTPATIENT
Start: 2022-09-16 | End: 2022-09-16 | Stop reason: HOSPADM

## 2022-09-16 RX ORDER — DIPHENHYDRAMINE HCL 25 MG
12.5 TABLET ORAL EVERY 6 HOURS PRN
Status: DISCONTINUED | OUTPATIENT
Start: 2022-09-16 | End: 2022-09-16 | Stop reason: HOSPADM

## 2022-09-16 RX ORDER — LIDOCAINE HYDROCHLORIDE 10 MG/ML
INJECTION, SOLUTION EPIDURAL; INFILTRATION; INTRACAUDAL; PERINEURAL AS NEEDED
Status: DISCONTINUED | OUTPATIENT
Start: 2022-09-16 | End: 2022-09-16

## 2022-09-16 RX ORDER — MAGNESIUM HYDROXIDE 1200 MG/15ML
LIQUID ORAL AS NEEDED
Status: DISCONTINUED | OUTPATIENT
Start: 2022-09-16 | End: 2022-09-16 | Stop reason: HOSPADM

## 2022-09-16 RX ORDER — OXYCODONE HYDROCHLORIDE 5 MG/1
5 TABLET ORAL EVERY 4 HOURS PRN
Status: DISCONTINUED | OUTPATIENT
Start: 2022-09-16 | End: 2022-09-16 | Stop reason: HOSPADM

## 2022-09-16 RX ORDER — ONDANSETRON 2 MG/ML
4 INJECTION INTRAMUSCULAR; INTRAVENOUS EVERY 6 HOURS PRN
Status: DISCONTINUED | OUTPATIENT
Start: 2022-09-16 | End: 2022-09-16 | Stop reason: HOSPADM

## 2022-09-16 RX ORDER — ACETAMINOPHEN 325 MG/1
975 TABLET ORAL ONCE
Status: COMPLETED | OUTPATIENT
Start: 2022-09-16 | End: 2022-09-16

## 2022-09-16 RX ORDER — MIDAZOLAM HYDROCHLORIDE 2 MG/2ML
INJECTION, SOLUTION INTRAMUSCULAR; INTRAVENOUS AS NEEDED
Status: DISCONTINUED | OUTPATIENT
Start: 2022-09-16 | End: 2022-09-16

## 2022-09-16 RX ORDER — HYDROMORPHONE HCL/PF 1 MG/ML
0.2 SYRINGE (ML) INJECTION
Status: DISCONTINUED | OUTPATIENT
Start: 2022-09-16 | End: 2022-09-16 | Stop reason: HOSPADM

## 2022-09-16 RX ORDER — ONDANSETRON 2 MG/ML
4 INJECTION INTRAMUSCULAR; INTRAVENOUS ONCE AS NEEDED
Status: DISCONTINUED | OUTPATIENT
Start: 2022-09-16 | End: 2022-09-16 | Stop reason: HOSPADM

## 2022-09-16 RX ORDER — ONDANSETRON 4 MG/1
4 TABLET, ORALLY DISINTEGRATING ORAL EVERY 6 HOURS PRN
Qty: 20 TABLET | Refills: 0 | Status: SHIPPED | OUTPATIENT
Start: 2022-09-16

## 2022-09-16 RX ORDER — ACETAMINOPHEN 325 MG/1
650 TABLET ORAL EVERY 6 HOURS PRN
Status: DISCONTINUED | OUTPATIENT
Start: 2022-09-16 | End: 2022-09-16 | Stop reason: HOSPADM

## 2022-09-16 RX ORDER — ONDANSETRON 2 MG/ML
INJECTION INTRAMUSCULAR; INTRAVENOUS AS NEEDED
Status: DISCONTINUED | OUTPATIENT
Start: 2022-09-16 | End: 2022-09-16

## 2022-09-16 RX ORDER — CEFAZOLIN SODIUM 1 G/50ML
1000 SOLUTION INTRAVENOUS ONCE
Status: DISCONTINUED | OUTPATIENT
Start: 2022-09-16 | End: 2022-09-16

## 2022-09-16 RX ORDER — DEXAMETHASONE SODIUM PHOSPHATE 10 MG/ML
INJECTION, SOLUTION INTRAMUSCULAR; INTRAVENOUS AS NEEDED
Status: DISCONTINUED | OUTPATIENT
Start: 2022-09-16 | End: 2022-09-16

## 2022-09-16 RX ORDER — CEPHALEXIN 500 MG/1
500 CAPSULE ORAL EVERY 12 HOURS SCHEDULED
Qty: 12 CAPSULE | Refills: 0 | Status: SHIPPED | OUTPATIENT
Start: 2022-09-16 | End: 2022-09-19

## 2022-09-16 RX ORDER — FENTANYL CITRATE/PF 50 MCG/ML
25 SYRINGE (ML) INJECTION
Status: DISCONTINUED | OUTPATIENT
Start: 2022-09-16 | End: 2022-09-16 | Stop reason: HOSPADM

## 2022-09-16 RX ORDER — FENTANYL CITRATE 50 UG/ML
INJECTION, SOLUTION INTRAMUSCULAR; INTRAVENOUS AS NEEDED
Status: DISCONTINUED | OUTPATIENT
Start: 2022-09-16 | End: 2022-09-16

## 2022-09-16 RX ORDER — PROPOFOL 10 MG/ML
INJECTION, EMULSION INTRAVENOUS AS NEEDED
Status: DISCONTINUED | OUTPATIENT
Start: 2022-09-16 | End: 2022-09-16

## 2022-09-16 RX ORDER — OXYCODONE HYDROCHLORIDE 5 MG/1
5 TABLET ORAL EVERY 4 HOURS PRN
Qty: 8 TABLET | Refills: 0 | Status: SHIPPED | OUTPATIENT
Start: 2022-09-16 | End: 2022-09-21

## 2022-09-16 RX ORDER — SODIUM CHLORIDE, SODIUM LACTATE, POTASSIUM CHLORIDE, CALCIUM CHLORIDE 600; 310; 30; 20 MG/100ML; MG/100ML; MG/100ML; MG/100ML
INJECTION, SOLUTION INTRAVENOUS CONTINUOUS PRN
Status: DISCONTINUED | OUTPATIENT
Start: 2022-09-16 | End: 2022-09-16

## 2022-09-16 RX ORDER — GABAPENTIN 300 MG/1
300 CAPSULE ORAL ONCE
Status: COMPLETED | OUTPATIENT
Start: 2022-09-16 | End: 2022-09-16

## 2022-09-16 RX ADMIN — Medication 3000 MG: at 07:39

## 2022-09-16 RX ADMIN — FENTANYL CITRATE 25 MCG: 50 INJECTION, SOLUTION INTRAMUSCULAR; INTRAVENOUS at 08:04

## 2022-09-16 RX ADMIN — GABAPENTIN 300 MG: 300 CAPSULE ORAL at 07:01

## 2022-09-16 RX ADMIN — LIDOCAINE HYDROCHLORIDE 50 MG: 10 INJECTION, SOLUTION EPIDURAL; INFILTRATION; INTRACAUDAL at 07:32

## 2022-09-16 RX ADMIN — SODIUM CHLORIDE, SODIUM LACTATE, POTASSIUM CHLORIDE, AND CALCIUM CHLORIDE: .6; .31; .03; .02 INJECTION, SOLUTION INTRAVENOUS at 07:04

## 2022-09-16 RX ADMIN — ONDANSETRON 4 MG: 2 INJECTION INTRAMUSCULAR; INTRAVENOUS at 07:32

## 2022-09-16 RX ADMIN — PROPOFOL 200 MG: 10 INJECTION, EMULSION INTRAVENOUS at 07:32

## 2022-09-16 RX ADMIN — FENTANYL CITRATE 25 MCG: 50 INJECTION, SOLUTION INTRAMUSCULAR; INTRAVENOUS at 07:40

## 2022-09-16 RX ADMIN — FENTANYL CITRATE 25 MCG: 50 INJECTION, SOLUTION INTRAMUSCULAR; INTRAVENOUS at 07:47

## 2022-09-16 RX ADMIN — MIDAZOLAM HYDROCHLORIDE 2 MG: 1 INJECTION, SOLUTION INTRAMUSCULAR; INTRAVENOUS at 07:29

## 2022-09-16 RX ADMIN — FENTANYL CITRATE 25 MCG: 50 INJECTION, SOLUTION INTRAMUSCULAR; INTRAVENOUS at 07:36

## 2022-09-16 RX ADMIN — ACETAMINOPHEN 975 MG: 325 TABLET, FILM COATED ORAL at 07:01

## 2022-09-16 RX ADMIN — DEXAMETHASONE SODIUM PHOSPHATE 10 MG: 10 INJECTION, SOLUTION INTRAMUSCULAR; INTRAVENOUS at 07:35

## 2022-09-16 NOTE — OP NOTE
OPERATIVE REPORT  PATIENT NAME: Estefany Juárez    :  1965  MRN: [id number]  Pt Location: [facility]    SURGERY DATE: 2022    Surgeon(s) and Role:     * Nellie Ovalle MD - Primary    Preop Diagnosis:  Nephrolithiasis [N20 0]    Post-Op Diagnosis Codes:     * Nephrolithiasis [N20 0]    Procedure(s) (LRB):  BILATERAL CYSTOSCOPY URETEROSCOPY WITH LITHOTRIPSY HOLMIUM LASER, BASKET STONE EXTRACTION, RETROGRADE PYELOGRAM AND LEFT INSERTION STENT URETERAL (N/A)   Procedure performed is as follows:   Cystoscopy with right retrograde pyelography with intraoperative interpretation and removal of right ureteral stent  Left retrograde pyelography with intraoperative interpretation, left ureteral stent exchange, left ureteroscopy with laser lithotripsy, left ureteral stone basketing    Specimen(s):  ID Type Source Tests Collected by Time Destination   A : LEFT URETERAL STONE FRAGMENTS Calculus Ureter, Left STONE ANALYSIS Nellie Ovalle MD 2022 [id number]        Estimated Blood Loss:   5 mL    Drains:  Ureteral Internal Stent Left ureter 6 Fr  (Active)   Number of days: 0       Anesthesia Type:   General    Operative Indications:  Nephrolithiasis [N20 0]      Operative Findings:  No filling defects noted within the right ureter, contrast seemed to drain nicely, no stones noted on CT scan or on retrograde pyelogram within the right ureter, the right ureteral stent was removed  Rigid ureteroscopy shows a distal left ureteral stone, consistent with the previously noted mid ureteral stone status post stenting  A combination dusting and fragmentation settings were used to break the stone into basket size fragments which were completely removed  A 6 [place] by 26 centimeter stent was placed on the left hand side on a string  The patient is stone free    Dwell time of his stent will be 5 days, he will see us back in 3 months with imaging prior    Complications:   None    Procedure and Technique:      PROCEDURES PERFORMED:  1) Cystoscopy  2) bilateral retrograde pyelography with fluoroscopic interpretation  3) left ureteroscopy with laser lithotripsy and basket extraction of stone  4) Left ureteral stent exchange (6F x 26cm), removal of right ureteral stent    SURGEON:  Kylie Hermosillo MD    ASSISTANTS:  None    NOTE:  There were no qualified teaching residents to assist with this case    ANESTHESIA: General     COMPLICATIONS:   None    ANTIBIOTICS:  Ancef    INTRAOPERATIVE THROMBOEMBOLISM PROPHYLAXIS:  Pneumatic compression stockings         FINDINGS:    1  The left calculus was not radiopaque on plain fluoroscopy  2  Retrograde pyelogram was performed on the right and left side using a 5 Fr open ended catheter  A total of 20 milliliters of contrast was necessary today    The following findings were noted:  No filling defect on the right-hand side, easy drainage of contrast status post stent removal   No filling defect on the left hand side except for the distal ureter      INDICATIONS FOR PROCEDURE:  Portillo Gu is an 56 y o  old male with complicated UTI status post bilateral stenting, noted mid ureteral stone, returns today for possible bilateral ureteroscopic intervention with stent removal and laser lithotripsy on left hand side stent exchange  After discussing the options for treatment, including medical expulsive therapy, extracorporeal shockwave lithotripsy, and ureteroscopy, the patient elected to undergo ureteroscopy and ureteral stent placement  We discussed the procedure in detail, the alternatives, and the risks, and they signed informed consent to proceed (these are outlined in the surgical consent form)  PROCEDURE IN DETAIL:     The patient was identified by name, date of birth, and MRN  and brought to the OR  Antibiotic prophylaxis and DVT prophylaxis were administered as per the guidelines    They were placed in the dorsal lithotomy position with care to pad all pressure points  They were prepped and draped in the usual sterile fashion  A surgical time out was performed with all in the room in agreement with the correct patient, procedure, indications, and laterality  A 21-English rigid cystoscope was used to enter the bladder  The bladder was inspected in its entirety and there were no lesions noted  The ureteral orifices were identified in their normal orthotopic positions  Trilobar hypertrophy is noted  An elevated bladder neck is noted  The right stent was grasped and delivered through the meatus, a wire was placed, retrograde pyelogram was performed showing no filling defects and prompt drainage of contrast after stent removal     The left stent was delivered per meatus and a wire was placed  Retrograde pyelography confirmed placement within the collecting system  Bladder was drained  A "7 5 Fr semi-rigid ureteroscope was advanced up the ureter under vision   The stone was encountered in the distal ureter location  The stone was not noted to be impacted  A holmium laser fiber was passed through the ureteroscope and laser lithotripsy was commenced at settings of between 0 4 and 1 5 J and between 5 and 40 Hz  The stones were fragmented to very small pieces and dust       Once we were satisfied that the stone was fragmented to dust, a 1 9 [place] zero tip nitinol basket was passed through the ureteroscope  The residual fragments were basketed out and removed  The ureteroscope was backed down the ureter under vision and there were no residual fragments and the ureter was noted to be intact with no injury and mild edema where the stone was located  A 6 English by 26 centimeter left JJ stent was then passed up the wire  under fluoroscopic guidance into the kidney with a good curl noted in the kidney and in the bladder  The stent string was not removed  The bladder was drained  All instrument counts and sponge counts were correct      The patient was placed back into the supine position, awakened from general anesthesia and brought to recovery room in stable condition  ESTIMATED BLOOD LOSS:  Minimal      DRAINS:   Ureteral Internal Stent Left ureter 6 Fr  (Active)       SPECIMENS:   Order Name Source Comment Collection Info Order Time   STONE ANALYSIS Ureter, Left  Collected By: Safia Son MD 9/16/2022  7:58 AM        IMPLANTS:   Implant Name Type Inv  Item Serial No   Lot No  LRB No  Used Action   STENT URETERAL 6 FR 26CM INLAY OPTIMA - [id number]  STENT URETERAL 6 FR 26CM INLAY  [employer] [id number] Left 1 Explanted   STENT URETERAL 6 FR 26CM INLAY OPTIMA - [id number]  STENT URETERAL 6 FR 26CM INLAY OPTIMA  [place] [employer] [id number] Right 1 Explanted   STENT URETERAL 6 FR 26CM INLAY OPTIMA - [id number]  STENT URETERAL 6 FR 26CM INLAY OPTIMA  [place] [employer] [id number] Left 1 Implanted        COMPLICATIONS:  None    DISPOSITION: PACU     PLAN:  The patient is status post right ureteral stent removal with bilateral retrograde pyelography and status post left ureteroscopy with laser lithotripsy with stone basketing  He will remove his stent in 5 days  He can see us back in the office in 3 months with an ultrasound prior      He may want to consider a TURP in the future for his trilobar hypertrophy with bladder outlet obstruction     I was present for the entire procedure and A qualified resident physician was not available    Patient Disposition:  PACU         SIGNATURE: [unfilled]  DATE: September 16, 2022  TIME: 8:06 AM

## 2022-09-16 NOTE — TELEPHONE ENCOUNTER
The patient is status post removal of his 10 [place] by 26 centimeter right ureteral stent placed by Dr Augustina Wiggins, please update the stent database  The stent on the left hand side also placed by Dr Shellie Feliciano was removed, please update her stent database accordingly  The patient has a new, 6 [place] by 26 centimeter left ureteral stent on a string, please have him remove this in 5 days    He can then see us in 3 months with an ultrasound prior, ordered

## 2022-09-16 NOTE — TELEPHONE ENCOUNTER
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  Spoke to Maura in office and she is okay for patient to return to work before stent is out if he is not having any moderate severe pain  Patient stated he will see how he feels tomorrow if he is able to work in the a m     Informed patient I will call him on Monday to see how he is doing and I will fill out disability paperwork at that time  Reviewed frequency, urgency, leakage with stent in place  Informed patient he may need a pad if there was continuous leakage as he stated he had a little leakage today  Patient had no further questions regarding stent  Patient verbalized understanding and thankful for call

## 2022-09-16 NOTE — ANESTHESIA POSTPROCEDURE EVALUATION
Post-Op Assessment Note    CV Status:  Stable  Pain Score: 0    Pain management: adequate     Mental Status:  Awake and alert   Hydration Status:  Euvolemic   PONV Controlled:  Controlled   Airway Patency:  Patent      Post Op Vitals Reviewed: Yes      Staff: CRNA, Anesthesiologist         No complications documented      BP   134/64   Temp   97 4   Pulse  63   Resp   18   SpO2   97

## 2022-09-16 NOTE — INTERVAL H&P NOTE
H&P reviewed  After examining the patient I find no changes in the patients condition since the H&P had been written      Vitals:    09/16/22 0657   BP: 144/82   Pulse: 77   Resp: 18   SpO2: 94%

## 2022-09-16 NOTE — ANESTHESIA PREPROCEDURE EVALUATION
Procedure:  CYSTOSCOPY URETEROSCOPY WITH LITHOTRIPSY HOLMIUM LASER, RETROGRADE PYELOGRAM AND INSERTION STENT URETERAL (N/A Bladder)    Relevant Problems   CARDIO   (+) Essential hypertension      ENDO   (+) Diabetes mellitus type 2       /RENAL   (+) Acute kidney injury superimposed on chronic kidney disease stage 3    (+) Nephrolithiasis   (+) Obstructive pyelonephritis   (+) Renal cyst   (+) Stage 3 chronic kidney disease (HCC)      MUSCULOSKELETAL   (+) Gout      PULMONARY   (+) Acute pharyngitis   (+) Acute upper respiratory infection      Endocrine   (+) Uncontrolled type 2 diabetes mellitus with hyperglycemia (HCC)      Genitourinary   (+) Gross hematuria      Other   (+) Abnormal urine cytology   (+) Elevated PSA   (+) Hyponatremia      Lab Results   Component Value Date    SODIUM 143 08/26/2022    K 4 0 08/26/2022     (H) 08/26/2022    CO2 27 08/26/2022    AGAP 7 08/26/2022    BUN 13 08/26/2022    CREATININE 1 53 (H) 08/26/2022    GLUC 114 08/11/2022    GLUF 110 (H) 08/26/2022    CALCIUM 9 4 08/26/2022    AST 15 08/26/2022    ALT 32 08/26/2022    ALKPHOS 78 08/26/2022    TP 7 6 08/26/2022    TBILI 0 79 08/26/2022    EGFR 50 08/26/2022     Lab Results   Component Value Date    WBC 6 56 08/26/2022    HGB 14 7 08/26/2022    HCT 45 2 08/26/2022    MCV 92 08/26/2022     08/26/2022       Physical Exam    Airway    Mallampati score: II  TM Distance: <3 FB  Neck ROM: full     Dental   No notable dental hx     Cardiovascular      Pulmonary      Other Findings        Anesthesia Plan  ASA Score- 3     Anesthesia Type- general with ASA Monitors  Additional Monitors:   Airway Plan: LMA  Plan Factors-Exercise tolerance (METS): >4 METS  Chart reviewed  EKG reviewed  Imaging results reviewed  Existing labs reviewed  Patient summary reviewed  Induction- intravenous  Postoperative Plan- Plan for postoperative opioid use       Informed Consent- Anesthetic plan and risks discussed with patient  I personally reviewed this patient with the CRNA  Discussed and agreed on the Anesthesia Plan with the CRNA  Shane Dykes

## 2022-09-19 NOTE — TELEPHONE ENCOUNTER
Called and spoke to patient  Patient stated he went to work on Saturday  Patient does not need disability paperwork filled out  Patient was scheduled for 3 month follow up  CS # gave to patient  Patient knows to have this done 2 weeks prior to appointment  Will call patient on Thursday morning to ensure stent was removed on Wednesday  Patient thankful for call and verbalized understanding

## 2022-09-21 LAB
COLOR STONE: NORMAL
COM MFR STONE: 100 %
COMMENT-STONE3: NORMAL
COMPOSITION: NORMAL
LABORATORY COMMENT REPORT: NORMAL
PHOTO: NORMAL
SIZE STONE: NORMAL MM
SPEC SOURCE SUBJ: NORMAL
STONE ANALYSIS-IMP: NORMAL
STONE ANALYSIS-IMP: NORMAL
WT STONE: 85 MG

## 2022-10-11 PROBLEM — N11.1 OBSTRUCTIVE PYELONEPHRITIS: Status: RESOLVED | Noted: 2022-08-08 | Resolved: 2022-10-11

## 2022-11-15 PROBLEM — J06.9 ACUTE UPPER RESPIRATORY INFECTION: Status: RESOLVED | Noted: 2022-09-16 | Resolved: 2022-11-15

## 2022-11-15 PROBLEM — J02.9 ACUTE PHARYNGITIS: Status: RESOLVED | Noted: 2022-09-16 | Resolved: 2022-11-15

## 2022-11-28 ENCOUNTER — HOSPITAL ENCOUNTER (OUTPATIENT)
Dept: ULTRASOUND IMAGING | Facility: HOSPITAL | Age: 57
Discharge: HOME | End: 2022-11-28
Attending: UROLOGY

## 2022-11-28 DIAGNOSIS — N20.0 NEPHROLITHIASIS: ICD-10-CM

## 2023-01-27 NOTE — PROGRESS NOTES
1/30/2023      Chief Complaint   Patient presents with   • Nephrolithiasis         Assessment and Plan    56 y o  male     1  Nephrolithiasis  - Ureteroscopy with removal of right stent and replacement of left stent performed 9/16/2022  - Left stent with string removed without difficulty at home  - Repeat imaging with ultrasound (11/28/2022) showing 3 mm calculus in the lower left kidney without hydronephrosis or renal lesions  - Patient feeling well overall, asymptomatic  - KUB and US in 1 year    2  Prostate cancer screening  - No recent PSA on file for review  - Most recent PSA 2/11/2022 was 2 24  - PSA in the near future and again in 1 year  - GAIL benign      History of Present Illness  Jayson Tuttle is a 56 y o  male patient with history of elevated PSA and nephrolithiasis here for follow up  Patient previously had PSA of 5, however repeat PSA in February 2022 was 2 24  Has not had updated PSA since  Patient had ureteroscopy performed bilaterally  Had stent removed with string without difficulty at home  Has been doing well since procedure  Repeat imaging with ultrasound (11/28/2022) showing no obstructing stone or hydronephrosis  Denies repeat symptoms  Denies family history of  malignancies  Review of Systems   Constitutional: Negative for activity change, appetite change, chills and fever  HENT: Negative for congestion and trouble swallowing  Respiratory: Negative for cough and shortness of breath  Cardiovascular: Negative for chest pain, palpitations and leg swelling  Gastrointestinal: Negative for abdominal pain, constipation, diarrhea, nausea and vomiting  Genitourinary: Negative for difficulty urinating, dysuria, flank pain, frequency, hematuria and urgency  Musculoskeletal: Negative for back pain and gait problem  Skin: Negative for wound  Allergic/Immunologic: Negative for immunocompromised state  Neurological: Negative for dizziness and syncope  Hematological: Does not bruise/bleed easily  Psychiatric/Behavioral: Negative for confusion  All other systems reviewed and are negative  AUA SYMPTOM SCORE    Flowsheet Row Most Recent Value   AUA SYMPTOM SCORE    How often have you had a sensation of not emptying your bladder completely after you finished urinating? 0 (P)     How often have you had to urinate again less than two hours after you finished urinating? 1 (P)     How often have you found you stopped and started again several times when you urinate? 0 (P)     How often have you found it difficult to postpone urination? 1 (P)     How often have you had a weak urinary stream? 0 (P)     How often have you had to push or strain to begin urination? 0 (P)     How many times did you most typically get up to urinate from the time you went to bed at night until the time you got up in the morning? 4 (P)     Quality of Life: If you were to spend the rest of your life with your urinary condition just the way it is now, how would you feel about that? 1 (P)     AUA SYMPTOM SCORE 6 (P)            Vitals  Vitals:    01/30/23 1337   BP: 124/88   Pulse: 77   SpO2: 98%   Weight: 130 kg (287 lb)   Height: 5' 8" (1 727 m)       Physical Exam  Constitutional:       General: He is not in acute distress  Appearance: Normal appearance  He is not ill-appearing, toxic-appearing or diaphoretic  HENT:      Head: Normocephalic  Nose: No congestion  Eyes:      General: No scleral icterus  Right eye: No discharge  Left eye: No discharge  Conjunctiva/sclera: Conjunctivae normal       Pupils: Pupils are equal, round, and reactive to light  Pulmonary:      Effort: Pulmonary effort is normal    Genitourinary:     Comments: Prostate smooth, symmetrical, no palpable nodules  Musculoskeletal:      Cervical back: Normal range of motion  Skin:     General: Skin is warm and dry  Coloration: Skin is not jaundiced or pale        Findings: No bruising, erythema, lesion or rash  Neurological:      General: No focal deficit present  Mental Status: He is alert and oriented to person, place, and time  Mental status is at baseline  Gait: Gait normal    Psychiatric:         Mood and Affect: Mood normal          Behavior: Behavior normal          Thought Content: Thought content normal          Judgment: Judgment normal            Past History  Past Medical History:   Diagnosis Date   • Diabetes mellitus ([facility] [address] )    • Elevated PSA    • Gout    • Hypertension    • Kidney stone    • Renal disorder    • Sleep apnea    • Stroke [facility])     TIA Aug 2019     Social History     Socioeconomic History   • Marital status: Single     Spouse name: None   • Number of children: None   • Years of education: None   • Highest education level: None   Occupational History   • None   Tobacco Use   • Smoking status: Never   • Smokeless tobacco: Never   Vaping Use   • Vaping Use: Never used   Substance and Sexual Activity   • Alcohol use: Not Currently   • Drug use: Not Currently   • Sexual activity: Yes   Other Topics Concern   • None   Social History Narrative   • None     Social Determinants of Health     Financial Resource Strain: Not on file   Food Insecurity: No Food Insecurity   • Worried About Running Out of Food in the Last Year: Never true   • Ran Out of Food in the Last Year: Never true   Transportation Needs: No Transportation Needs   • Lack of Transportation (Medical): No   • Lack of Transportation (Non-Medical):  No   Physical Activity: Not on file   Stress: Not on file   Social Connections: Not on file   Intimate Partner Violence: Not on file   Housing Stability: Low Risk    • Unable to Pay for Housing in the Last Year: No   • Number of Places Lived in the Last Year: 1   • Unstable Housing in the Last Year: No     Social History     Tobacco Use   Smoking Status Never   Smokeless Tobacco Never     Family History   Problem Relation Age of Onset   • No Known Problems Father    • Gallbladder disease Mother    • Colon cancer Maternal Grandmother    • Thyroid disease Sister    • Asthma Sister    • Gallbladder disease Sister         recent removal of gallbladder   • No Known Problems Son        The following portions of the patient's history were reviewed and updated as appropriate: allergies, current medications, past medical history, past social history, past surgical history and problem list     Results  No results found for this or any previous visit (from the past 1 hour(s))  ]  No results found for: PSA  Lab Results   Component Value Date    CALCIUM 9 4 08/26/2022    K 4 0 08/26/2022    CO2 27 08/26/2022     (H) 08/26/2022    BUN 13 08/26/2022    CREATININE 1 53 (H) 08/26/2022     Lab Results   Component Value Date    WBC 6 56 08/26/2022    HGB 14 7 08/26/2022    HCT 45 2 08/26/2022    MCV 92 08/26/2022     08/26/2022       Nemo Hancock PA-C

## 2023-01-30 ENCOUNTER — OFFICE VISIT (OUTPATIENT)
Dept: UROLOGY | Facility: CLINIC | Age: 58
End: 2023-01-30

## 2023-01-30 VITALS
DIASTOLIC BLOOD PRESSURE: 88 MMHG | HEIGHT: 68 IN | BODY MASS INDEX: 43.5 KG/M2 | OXYGEN SATURATION: 98 % | SYSTOLIC BLOOD PRESSURE: 124 MMHG | WEIGHT: 287 LBS | HEART RATE: 77 BPM

## 2023-01-30 DIAGNOSIS — Z12.5 SCREENING FOR PROSTATE CANCER: Primary | ICD-10-CM

## 2023-01-30 DIAGNOSIS — N20.0 NEPHROLITHIASIS: ICD-10-CM

## 2023-01-30 RX ORDER — ROSUVASTATIN CALCIUM 5 MG/1
TABLET, COATED ORAL DAILY
COMMUNITY
Start: 2023-01-30

## 2023-09-18 ENCOUNTER — APPOINTMENT (OUTPATIENT)
Dept: LAB | Age: 58
End: 2023-09-18
Payer: COMMERCIAL

## 2023-09-18 ENCOUNTER — APPOINTMENT (OUTPATIENT)
Dept: RADIOLOGY | Age: 58
End: 2023-09-18
Payer: COMMERCIAL

## 2023-09-18 DIAGNOSIS — Z12.5 SCREENING FOR PROSTATE CANCER: ICD-10-CM

## 2023-09-18 DIAGNOSIS — M10.9 GOUT, UNSPECIFIED CAUSE, UNSPECIFIED CHRONICITY, UNSPECIFIED SITE: ICD-10-CM

## 2023-09-18 LAB — PSA SERPL-MCNC: 1.83 NG/ML (ref 0–4)

## 2023-09-18 PROCEDURE — 84153 ASSAY OF PSA TOTAL: CPT

## 2023-09-18 PROCEDURE — 73630 X-RAY EXAM OF FOOT: CPT

## 2023-09-18 PROCEDURE — 36415 COLL VENOUS BLD VENIPUNCTURE: CPT

## 2023-09-18 PROCEDURE — 73080 X-RAY EXAM OF ELBOW: CPT

## 2023-10-30 ENCOUNTER — HOSPITAL ENCOUNTER (OUTPATIENT)
Dept: ULTRASOUND IMAGING | Facility: HOSPITAL | Age: 58
Discharge: HOME | End: 2023-10-30
Payer: COMMERCIAL

## 2023-10-30 DIAGNOSIS — N20.0 NEPHROLITHIASIS: ICD-10-CM

## 2023-10-30 PROCEDURE — 76775 US EXAM ABDO BACK WALL LIM: CPT

## 2023-11-08 ENCOUNTER — TELEPHONE (OUTPATIENT)
Dept: UROLOGY | Facility: CLINIC | Age: 58
End: 2023-11-08

## 2023-11-08 NOTE — TELEPHONE ENCOUNTER
Called and left VM for the PT with results. Office number left for the PT if any questions.    ----- Message from Abdullahi Veras PA-C sent at 11/7/2023  3:46 PM EST -----  Please let patient know [address] is negative.

## 2023-12-20 ENCOUNTER — PREP FOR PROCEDURE (OUTPATIENT)
Age: 58
End: 2023-12-20

## 2023-12-20 ENCOUNTER — PATIENT MESSAGE (OUTPATIENT)
Age: 58
End: 2023-12-20

## 2023-12-20 ENCOUNTER — TELEPHONE (OUTPATIENT)
Age: 58
End: 2023-12-20

## 2023-12-20 DIAGNOSIS — Z12.11 SCREENING FOR COLON CANCER: Primary | ICD-10-CM

## 2023-12-20 NOTE — TELEPHONE ENCOUNTER
Scheduled date of colonoscopy (as of today): 2/15/24  Physician performing colonoscopy:   Location of colonoscopy: [place]  Bowel prep reviewed with patient: Miralax Dulcolax prep instructions reviewed and sent via [employer]  Instructions reviewed with patient by: md  Clearances:

## 2023-12-20 NOTE — TELEPHONE ENCOUNTER
12/20/23  Screened by: Aleah Rousseau    Referring Provider     Pre- Screening:     There is no height or weight on file to calculate BMI. 43.64  Has patient been referred for a routine screening Colonoscopy? yes  Is the patient between 45-75 years old? no      Previous Colonoscopy no   If yes:    Date:     Facility:     Reason:       SCHEDULING STAFF: If the patient is between 45yrs-49yrs, please advise patient to confirm benefits/coverage with their insurance company for a routine screening colonoscopy, some insurance carriers will only cover at 50yrs or older. If the patient is over 75years old, please schedule an office visit.     Does the patient want to see a Gastroenterologist prior to their procedure OR are they having any GI symptoms? no    Has the patient been hospitalized or had abdominal surgery in the past 6 months? no    Does the patient use supplemental oxygen? no    Does the patient take Coumadin, Lovenox, Plavix, Elliquis, Xarelto, or other blood thinning medication? no    Has the patient had a stroke, cardiac event, or stent placed in the past year? no    Patient passed OA    SCHEDULING STAFF: If patient answers NO to above questions, then schedule procedure. If patient answers YES to above questions, then schedule office appointment.     If patient is between 45yrs - 49yrs, please advise patient that we will have to confirm benefits & coverage with their insurance company for a routine screening colonoscopy.

## 2024-03-18 ENCOUNTER — OFFICE VISIT (OUTPATIENT)
Dept: UROLOGY | Facility: CLINIC | Age: 59
End: 2024-03-18
Payer: COMMERCIAL

## 2024-03-18 VITALS
WEIGHT: 297 LBS | SYSTOLIC BLOOD PRESSURE: 174 MMHG | TEMPERATURE: 97.8 F | HEART RATE: 77 BPM | HEIGHT: 68 IN | BODY MASS INDEX: 45.01 KG/M2 | DIASTOLIC BLOOD PRESSURE: 94 MMHG | OXYGEN SATURATION: 98 %

## 2024-03-18 DIAGNOSIS — N20.0 NEPHROLITHIASIS: Primary | ICD-10-CM

## 2024-03-18 PROCEDURE — 99213 OFFICE O/P EST LOW 20 MIN: CPT | Performed by: PHYSICIAN ASSISTANT

## 2024-03-18 RX ORDER — DAPAGLIFLOZIN 10 MG/1
TABLET, FILM COATED ORAL
COMMUNITY
Start: 2023-12-21

## 2024-03-18 NOTE — PROGRESS NOTES
3/18/2024      Chief Complaint   Patient presents with    Follow-up         Assessment and Plan    59 y.o. male     1. Nephrolithiasis   - US kidney and bladder (10/30/23) showing simple renal cyst and non-obstructing intrarenal stone. Negative for hydronephrosis and bilateral ureteral jets seen  - Asymptomatic    2. Prostate cancer screening  - PSA (9/18/23) 1.83  - Continue routine prostate cancer screening with PCP. Follow up as needed. See PCP for elevated PSA  - Call with any questions or concerns in the meantime  - All questions answered; patient understands and agrees with plan       History of Present Illness  Jayson Alvarez is a 59 y.o. male patient with history of elevated PSA and nephrolithiasis here for follow up.     History of elevated PSA. Most recent PSA 1.83. Denies prior prostate biopsy. History of nephrolithiasis. Patient had ureteroscopy performed bilaterally. Had stent removed with string without difficulty at home. Has been doing well since procedure.  Negative follow up US. Denies new or worsening symptoms.     Review of Systems   Constitutional:  Negative for activity change, appetite change, chills and fever.   HENT:  Negative for congestion and trouble swallowing.    Respiratory:  Negative for cough and shortness of breath.    Cardiovascular:  Negative for chest pain, palpitations and leg swelling.   Gastrointestinal:  Negative for abdominal pain, constipation, diarrhea, nausea and vomiting.   Genitourinary:  Negative for difficulty urinating, dysuria, flank pain, frequency, hematuria and urgency.   Musculoskeletal:  Negative for back pain and gait problem.   Skin:  Negative for wound.   Allergic/Immunologic: Negative for immunocompromised state.   Neurological:  Negative for dizziness and syncope.   Hematological:  Does not bruise/bleed easily.   Psychiatric/Behavioral:  Negative for confusion.    All other systems reviewed and are negative.      Vitals  Vitals:    03/18/24 1530   BP:  "(!) 174/94   Pulse: 77   Temp: 97.8 °F (36.6 °C)   TempSrc: Temporal   SpO2: 98%   Weight: 135 kg (297 lb)   Height: 5' 8\" (1.727 m)       Physical Exam  Constitutional:       General: He is not in acute distress.     Appearance: Normal appearance. He is not ill-appearing, toxic-appearing or diaphoretic.   HENT:      Head: Normocephalic.      Nose: No congestion.   Eyes:      General: No scleral icterus.        Right eye: No discharge.         Left eye: No discharge.      Conjunctiva/sclera: Conjunctivae normal.      Pupils: Pupils are equal, round, and reactive to light.   Pulmonary:      Effort: Pulmonary effort is normal.   Musculoskeletal:      Cervical back: Normal range of motion.   Skin:     General: Skin is warm and dry.      Coloration: Skin is not jaundiced or pale.      Findings: No bruising, erythema, lesion or rash.   Neurological:      General: No focal deficit present.      Mental Status: He is alert and oriented to person, place, and time. Mental status is at baseline.      Gait: Gait normal.   Psychiatric:         Mood and Affect: Mood normal.         Behavior: Behavior normal.         Thought Content: Thought content normal.         Judgment: Judgment normal.           Past History  Past Medical History:   Diagnosis Date    Diabetes mellitus ([facility])     Elevated PSA     Gout     Hypertension     Kidney stone     Renal disorder     Sleep apnea     Stroke ([facility])     TIA Aug 2019     Social History     Socioeconomic History    Marital status: Single     Spouse name: None    Number of children: None    Years of education: None    Highest education level: None   Occupational History    None   Tobacco Use    Smoking status: Never     Passive exposure: Past    Smokeless tobacco: Never   Vaping Use    Vaping status: Never Used   Substance and Sexual Activity    Alcohol use: Not Currently    Drug use: Not Currently    Sexual activity: Yes   Other Topics Concern    None   Social History Narrative    None "     Social Determinants of Health     Financial Resource Strain: Not on file   Food Insecurity: No Food Insecurity (8/9/2022)    Hunger Vital Sign     Worried About Running Out of Food in the Last Year: Never true     Ran Out of Food in the Last Year: Never true   Transportation Needs: No Transportation Needs (8/9/2022)    PRAPARE - Transportation     Lack of Transportation (Medical): No     Lack of Transportation (Non-Medical): No   Physical Activity: Not on file   Stress: Not on file   Social Connections: Not on file   Intimate Partner Violence: Not on file   Housing Stability: Low Risk  (8/9/2022)    Housing Stability Vital Sign     Unable to Pay for Housing in the Last Year: No     Number of Places Lived in the Last Year: 1     Unstable Housing in the Last Year: No     Social History     Tobacco Use   Smoking Status Never    Passive exposure: Past   Smokeless Tobacco Never     Family History   Problem Relation Age of Onset    No Known Problems Father     Gallbladder disease Mother     Colon cancer Maternal Grandmother     Thyroid disease Sister     Asthma Sister     Gallbladder disease Sister         recent removal of gallbladder    No Known Problems Son        The following portions of the patient's history were reviewed and updated as appropriate: allergies, current medications, past medical history, past social history, past surgical history and problem list.    Results  No results found for this or any previous visit (from the past 1 hour(s)).]  Lab Results   Component Value Date    PSA 1.83 09/18/2023     Lab Results   Component Value Date    CALCIUM 9.4 08/26/2022    K 4.0 08/26/2022    CO2 27 08/26/2022     (H) 08/26/2022    BUN 13 08/26/2022    CREATININE 1.53 (H) 08/26/2022     Lab Results   Component Value Date    WBC 6.56 08/26/2022    HGB 14.7 08/26/2022    HCT 45.2 08/26/2022    MCV 92 08/26/2022     08/26/2022       Nemo Hancock PA-C

## 2024-05-28 ENCOUNTER — TELEPHONE (OUTPATIENT)
Age: 59
End: 2024-05-28

## 2024-05-28 NOTE — TELEPHONE ENCOUNTER
Received call from patient asking to chung a new patient appt.    I offered the patient the next available in [place] in 2025.    Patient declined.    I then offered the next available in July at the [place] office.    Patient declined as well.    Patient stated he will look somewhere else

## 2024-08-02 ENCOUNTER — APPOINTMENT (OUTPATIENT)
Dept: LAB | Age: 59
End: 2024-08-02
Payer: COMMERCIAL

## 2024-08-02 ENCOUNTER — TELEPHONE (OUTPATIENT)
Age: 59
End: 2024-08-02

## 2024-08-02 DIAGNOSIS — R31.0 GROSS HEMATURIA: ICD-10-CM

## 2024-08-02 DIAGNOSIS — R31.9 HEMATURIA, UNSPECIFIED TYPE: ICD-10-CM

## 2024-08-02 DIAGNOSIS — R31.9 URINARY TRACT INFECTION WITH HEMATURIA, SITE UNSPECIFIED: ICD-10-CM

## 2024-08-02 DIAGNOSIS — R31.0 GROSS HEMATURIA: Primary | ICD-10-CM

## 2024-08-02 DIAGNOSIS — R10.9 ACUTE FLANK PAIN: ICD-10-CM

## 2024-08-02 DIAGNOSIS — N39.0 URINARY TRACT INFECTION WITH HEMATURIA, SITE UNSPECIFIED: ICD-10-CM

## 2024-08-02 DIAGNOSIS — R31.9 HEMATURIA, UNSPECIFIED TYPE: Primary | ICD-10-CM

## 2024-08-02 LAB
ANION GAP SERPL CALCULATED.3IONS-SCNC: 11 MMOL/L (ref 4–13)
BACTERIA UR QL AUTO: ABNORMAL /HPF
BILIRUB UR QL STRIP: NEGATIVE
BUN SERPL-MCNC: 24 MG/DL (ref 5–25)
CALCIUM SERPL-MCNC: 9.6 MG/DL (ref 8.4–10.2)
CHLORIDE SERPL-SCNC: 102 MMOL/L (ref 96–108)
CLARITY UR: CLEAR
CO2 SERPL-SCNC: 28 MMOL/L (ref 21–32)
COLOR UR: ABNORMAL
CREAT SERPL-MCNC: 1.75 MG/DL (ref 0.6–1.3)
GFR SERPL CREATININE-BSD FRML MDRD: 42 ML/MIN/1.73SQ M
GLUCOSE SERPL-MCNC: 155 MG/DL (ref 65–140)
GLUCOSE UR STRIP-MCNC: ABNORMAL MG/DL
HGB UR QL STRIP.AUTO: ABNORMAL
KETONES UR STRIP-MCNC: NEGATIVE MG/DL
LEUKOCYTE ESTERASE UR QL STRIP: ABNORMAL
NITRITE UR QL STRIP: NEGATIVE
NON-SQ EPI CELLS URNS QL MICRO: ABNORMAL /HPF
PH UR STRIP.AUTO: 6.5 [PH]
POTASSIUM SERPL-SCNC: 4 MMOL/L (ref 3.5–5.3)
PROT UR STRIP-MCNC: ABNORMAL MG/DL
PSA SERPL-MCNC: 2.4 NG/ML (ref 0–4)
RBC #/AREA URNS AUTO: ABNORMAL /HPF
SODIUM SERPL-SCNC: 141 MMOL/L (ref 135–147)
SP GR UR STRIP.AUTO: 1.02 (ref 1–1.03)
UROBILINOGEN UR STRIP-ACNC: <2 MG/DL
WBC #/AREA URNS AUTO: ABNORMAL /HPF

## 2024-08-02 PROCEDURE — 81001 URINALYSIS AUTO W/SCOPE: CPT

## 2024-08-02 PROCEDURE — 87086 URINE CULTURE/COLONY COUNT: CPT

## 2024-08-02 PROCEDURE — 80048 BASIC METABOLIC PNL TOTAL CA: CPT

## 2024-08-02 NOTE — TELEPHONE ENCOUNTER
"Spoke to patient and he has kidney stone history. He is experiencing \"pink\" colored urine with each void since yesterday. Slight discomfort at the end of stream. Voiding well overall, no dysuria, no clots or split stream. He denies any abdominal or flank pain. No nausea, vomiting, fevers or chills. He is trying to hydrate as best as he can. I gave him care advice to continue proper hydration, placed urine orders and reviewed ER precautions. Please advise of further recommendations.   "

## 2024-08-02 NOTE — TELEPHONE ENCOUNTER
Patient seen by Nemo at [place]    Patient called stating he noticed yesterday he urine is pink color and he is not sure if he needs to come in. No other symptoms just some blood in urine.     Patient can be reached at [phone]

## 2024-08-02 NOTE — TELEPHONE ENCOUNTER
CTU ordered. Please schedule follow up to review results and for symptom assessment. ED precautions

## 2024-08-03 DIAGNOSIS — R97.20 ELEVATED PSA: Primary | ICD-10-CM

## 2024-08-03 LAB — BACTERIA UR CULT: NORMAL

## 2024-08-05 ENCOUNTER — TELEPHONE (OUTPATIENT)
Dept: UROLOGY | Facility: CLINIC | Age: 59
End: 2024-08-05

## 2024-08-05 NOTE — TELEPHONE ENCOUNTER
Spoke with pt informing pt to repeat PSA prior to their upcoming appt with Nemo CROWELL. Pt verbalized understanding and stated when should he have this done? I informed pt he can get it done a week before his appt. Pt verbalized understanding     ----- Message from Nemo Hancock PA-C sent at 8/3/2024 12:06 PM EDT -----  Repeat PSA prior to visit.

## 2024-08-22 ENCOUNTER — HOSPITAL ENCOUNTER (OUTPATIENT)
Dept: CT IMAGING | Facility: HOSPITAL | Age: 59
End: 2024-08-22
Payer: COMMERCIAL

## 2024-08-22 DIAGNOSIS — R31.0 GROSS HEMATURIA: ICD-10-CM

## 2024-08-22 PROCEDURE — 74178 CT ABD&PLV WO CNTR FLWD CNTR: CPT

## 2024-08-22 RX ADMIN — IOHEXOL 100 ML: 350 INJECTION, SOLUTION INTRAVENOUS at 14:23

## 2024-09-10 ENCOUNTER — TELEPHONE (OUTPATIENT)
Dept: UROLOGY | Facility: CLINIC | Age: 59
End: 2024-09-10

## 2024-09-10 ENCOUNTER — APPOINTMENT (OUTPATIENT)
Dept: LAB | Age: 59
End: 2024-09-10
Payer: COMMERCIAL

## 2024-09-10 DIAGNOSIS — R97.20 ELEVATED PSA: ICD-10-CM

## 2024-09-10 LAB — PSA SERPL-MCNC: 3.06 NG/ML (ref 0–4)

## 2024-09-10 PROCEDURE — 84153 ASSAY OF PSA TOTAL: CPT

## 2024-09-10 PROCEDURE — 36415 COLL VENOUS BLD VENIPUNCTURE: CPT

## 2024-09-10 NOTE — TELEPHONE ENCOUNTER
Spoke with pt providing a friendly reminder to please get their PSA done prior to their appt. Informed pt they can get it done at any [facility] lab. Pt verbalized understanding. No further assistance

## 2024-09-17 NOTE — TELEPHONE ENCOUNTER
Pt called and stated he missed his scheduled follow up this morning due to being covid positive. Pt scheduled for the next available of 10/18 and placed on wait list.

## 2024-10-16 NOTE — PROGRESS NOTES
10/18/2024      Chief Complaint   Patient presents with    Gross Hematuria    Follow-up         Assessment and Plan    59 y.o. male     1. Gross hematuria   - Will send urine for culture  - CT renal protocol (8/22/24) negative other than small non-obstructing intrarenal stone and right renal cysts  - Urine cytology sent  - Discussed cystoscopy as recommended to complete workup. Patient is moving to [place] next week. States he will establish with urologist down there. Will sign records release    2. Rising PSA   -PSA now 3.060, trend as below  - GAIL today benign, enlarged prostate  -Due to rising PSA, I do recommend patient have MRI of prostate performed for evaluation of any concerning lesions that would need biopsying.  Again patient is moving to [place] and will attempt to have this performed prior to moving, however if unable to have this done prior to the move, will have this done once established in [place]  - I also ordered an updated PSA  - Call with any questions or concerns in the meantime  - All questions answered; patient understands and agrees with plan       History of Present Illness  Jayson Alvarez is a 59 y.o. male patient with history of nephrolithiasis here for follow up.     Patient was previously seen in the office earlier this year for nephrolithiasis.  Imaging at that time was negative for any obstructing stones and bilateral jets were detected on ultrasound.  Patient had an episode of gross hematuria in August 2024.  He underwent CT renal protocol showing nonobstructing intrarenal stones without any obstructing ureteral stones.  He also was found to have simple renal cysts.  No findings to explain gross hematuria.  Patient had recent PSA of 3.06, previously 2.3 and 1.8.  Denies family history of  malignancies.  States that he did have COVID around the time of having most recent PSA performed.  Patient states that he is moving to [place] next week.  Denies fever, chills, nausea, vomiting,  "dysuria, flank pain.  Patient does have baseline back pain.    Component  Ref Range & Units 9/10/24  2:00 PM 8/2/24 10:59 AM 9/18/23 12:40 PM   PSA, Diagnostic  0.000 - 4.000 ng/mL 3.060 2.396 CM 1.83 R, CM       Review of Systems   Constitutional:  Negative for activity change, appetite change, chills and fever.   HENT:  Negative for congestion and trouble swallowing.    Respiratory:  Negative for cough and shortness of breath.    Cardiovascular:  Negative for chest pain, palpitations and leg swelling.   Gastrointestinal:  Negative for abdominal pain, constipation, diarrhea, nausea and vomiting.   Genitourinary:  Positive for hematuria. Negative for difficulty urinating, dysuria, flank pain, frequency and urgency.   Musculoskeletal:  Negative for back pain and gait problem.   Skin:  Negative for wound.   Allergic/Immunologic: Negative for immunocompromised state.   Neurological:  Negative for dizziness and syncope.   Hematological:  Does not bruise/bleed easily.   Psychiatric/Behavioral:  Negative for confusion.    All other systems reviewed and are negative.      Vitals  Vitals:    10/18/24 1302   BP: (!) 172/84   Pulse: 93   Resp: 18   Temp: 98 °F (36.7 °C)   TempSrc: Temporal   SpO2: 95%   Weight: 132 kg (290 lb 12.8 oz)   Height: 5' 8\" (1.727 m)       Physical Exam  Constitutional:       General: He is not in acute distress.     Appearance: Normal appearance. He is not ill-appearing, toxic-appearing or diaphoretic.   HENT:      Head: Normocephalic.      Nose: No congestion.   Eyes:      General: No scleral icterus.        Right eye: No discharge.         Left eye: No discharge.      Conjunctiva/sclera: Conjunctivae normal.      Pupils: Pupils are equal, round, and reactive to light.   Pulmonary:      Effort: Pulmonary effort is normal.   Genitourinary:     Comments: Prostate smooth, symmetrical, no palpable nodules    Musculoskeletal:      Cervical back: Normal range of motion.   Skin:     General: Skin is warm " and dry.      Coloration: Skin is not jaundiced or pale.      Findings: No bruising, erythema, lesion or rash.   Neurological:      General: No focal deficit present.      Mental Status: He is alert and oriented to person, place, and time. Mental status is at baseline.      Gait: Gait normal.   Psychiatric:         Mood and Affect: Mood normal.         Behavior: Behavior normal.         Thought Content: Thought content normal.         Judgment: Judgment normal.           Past History  Past Medical History:   Diagnosis Date    Diabetes mellitus (HCC)     Elevated PSA     Gout     Hypertension     Kidney stone     Renal disorder     Sleep apnea     Stroke (HCC)     TIA Aug 2019     Social History     Socioeconomic History    Marital status: Single     Spouse name: None    Number of children: None    Years of education: None    Highest education level: None   Occupational History    None   Tobacco Use    Smoking status: Never     Passive exposure: Past    Smokeless tobacco: Never   Vaping Use    Vaping status: Never Used   Substance and Sexual Activity    Alcohol use: Not Currently    Drug use: Not Currently    Sexual activity: Yes     Partners: Female   Other Topics Concern    None   Social History Narrative    None     Social Determinants of Health     Financial Resource Strain: Not on file   Food Insecurity: No Food Insecurity (8/9/2022)    Hunger Vital Sign     Worried About Running Out of Food in the Last Year: Never true     Ran Out of Food in the Last Year: Never true   Transportation Needs: No Transportation Needs (8/9/2022)    PRAPARE - Transportation     Lack of Transportation (Medical): No     Lack of Transportation (Non-Medical): No   Physical Activity: Not on file   Stress: Not on file   Social Connections: Not on file   Intimate Partner Violence: Not on file   Housing Stability: Low Risk  (8/9/2022)    Housing Stability Vital Sign     Unable to Pay for Housing in the Last Year: No     Number of Places  Lived in the Last Year: 1     Unstable Housing in the Last Year: No     Social History     Tobacco Use   Smoking Status Never    Passive exposure: Past   Smokeless Tobacco Never     Family History   Problem Relation Age of Onset    No Known Problems Father     Gallbladder disease Mother     Colon cancer Maternal Grandmother     Thyroid disease Sister     Asthma Sister     Gallbladder disease Sister         recent removal of gallbladder    No Known Problems Son        The following portions of the patient's history were reviewed and updated as appropriate: allergies, current medications, past medical history, past social history, past surgical history and problem list.    Results  Recent Results (from the past 1 hour(s))   POCT urine dip    Collection Time: 10/18/24  1:07 PM   Result Value Ref Range    LEUKOCYTE ESTERASE,UA +     NITRITE,UA +     SL AMB POCT UROBILINOGEN 1     POCT URINE PROTEIN +      PH,UA +++     BLOOD,UA 6.0     SPECIFIC GRAVITY,UA +     KETONES,UA 1.030     BILIRUBIN,UA +     GLUCOSE, UA +      COLOR,UA dark     CLARITY,UA clear    ]  Lab Results   Component Value Date    PSA 3.060 09/10/2024    PSA 2.396 08/02/2024    PSA 1.83 09/18/2023     Lab Results   Component Value Date    CALCIUM 9.6 08/02/2024    K 4.0 08/02/2024    CO2 28 08/02/2024     08/02/2024    BUN 24 08/02/2024    CREATININE 1.75 (H) 08/02/2024     Lab Results   Component Value Date    WBC 6.56 08/26/2022    HGB 14.7 08/26/2022    HCT 45.2 08/26/2022    MCV 92 08/26/2022     08/26/2022       Nemo Hancock PA-C

## 2024-10-18 ENCOUNTER — OFFICE VISIT (OUTPATIENT)
Dept: UROLOGY | Facility: CLINIC | Age: 59
End: 2024-10-18
Payer: COMMERCIAL

## 2024-10-18 VITALS
DIASTOLIC BLOOD PRESSURE: 84 MMHG | TEMPERATURE: 98 F | RESPIRATION RATE: 18 BRPM | HEART RATE: 93 BPM | WEIGHT: 290.8 LBS | OXYGEN SATURATION: 95 % | BODY MASS INDEX: 44.07 KG/M2 | HEIGHT: 68 IN | SYSTOLIC BLOOD PRESSURE: 172 MMHG

## 2024-10-18 DIAGNOSIS — R31.0 GROSS HEMATURIA: Primary | ICD-10-CM

## 2024-10-18 DIAGNOSIS — R97.20 RISING PSA LEVEL: ICD-10-CM

## 2024-10-18 LAB
SL AMB  POCT GLUCOSE, UA: NORMAL
SL AMB LEUKOCYTE ESTERASE,UA: NORMAL
SL AMB POCT BILIRUBIN,UA: NORMAL
SL AMB POCT BLOOD,UA: 6
SL AMB POCT CLARITY,UA: CLEAR
SL AMB POCT COLOR,UA: NORMAL
SL AMB POCT KETONES,UA: 1.03
SL AMB POCT NITRITE,UA: NORMAL
SL AMB POCT PH,UA: NORMAL
SL AMB POCT SPECIFIC GRAVITY,UA: NORMAL
SL AMB POCT URINE PROTEIN: NORMAL
SL AMB POCT UROBILINOGEN: 1

## 2024-10-18 PROCEDURE — 81002 URINALYSIS NONAUTO W/O SCOPE: CPT | Performed by: PHYSICIAN ASSISTANT

## 2024-10-18 PROCEDURE — 87086 URINE CULTURE/COLONY COUNT: CPT | Performed by: PHYSICIAN ASSISTANT

## 2024-10-18 PROCEDURE — 88112 CYTOPATH CELL ENHANCE TECH: CPT | Performed by: PATHOLOGY

## 2024-10-18 PROCEDURE — 99213 OFFICE O/P EST LOW 20 MIN: CPT | Performed by: PHYSICIAN ASSISTANT

## 2024-10-19 LAB — BACTERIA UR CULT: NORMAL

## 2024-10-25 ENCOUNTER — APPOINTMENT (OUTPATIENT)
Dept: LAB | Age: 59
End: 2024-10-25
Payer: COMMERCIAL

## 2024-10-25 DIAGNOSIS — R97.20 RISING PSA LEVEL: ICD-10-CM

## 2024-10-25 LAB
ANION GAP SERPL CALCULATED.3IONS-SCNC: 10 MMOL/L (ref 4–13)
BUN SERPL-MCNC: 22 MG/DL (ref 5–25)
CALCIUM SERPL-MCNC: 9.1 MG/DL (ref 8.4–10.2)
CHLORIDE SERPL-SCNC: 104 MMOL/L (ref 96–108)
CO2 SERPL-SCNC: 28 MMOL/L (ref 21–32)
CREAT SERPL-MCNC: 1.75 MG/DL (ref 0.6–1.3)
GFR SERPL CREATININE-BSD FRML MDRD: 42 ML/MIN/1.73SQ M
GLUCOSE P FAST SERPL-MCNC: 150 MG/DL (ref 65–99)
POTASSIUM SERPL-SCNC: 3.6 MMOL/L (ref 3.5–5.3)
PSA SERPL-MCNC: 3.8 NG/ML (ref 0–4)
SODIUM SERPL-SCNC: 142 MMOL/L (ref 135–147)

## 2024-10-25 PROCEDURE — 80048 BASIC METABOLIC PNL TOTAL CA: CPT

## 2024-10-25 PROCEDURE — 36415 COLL VENOUS BLD VENIPUNCTURE: CPT

## 2024-10-25 PROCEDURE — 84153 ASSAY OF PSA TOTAL: CPT

## 2024-10-27 DIAGNOSIS — Z00.6 ENCOUNTER FOR EXAMINATION FOR NORMAL COMPARISON OR CONTROL IN CLINICAL RESEARCH PROGRAM: ICD-10-CM

## 2024-10-29 ENCOUNTER — HOSPITAL ENCOUNTER (OUTPATIENT)
Dept: RADIOLOGY | Age: 59
Discharge: HOME | End: 2024-10-29
Payer: COMMERCIAL

## 2024-10-29 ENCOUNTER — TELEPHONE (OUTPATIENT)
Age: 59
End: 2024-10-29

## 2024-10-29 DIAGNOSIS — R97.20 RISING PSA LEVEL: ICD-10-CM

## 2024-10-29 PROCEDURE — 72197 MRI PELVIS W/O & W/DYE: CPT

## 2024-10-29 PROCEDURE — A9585 GADOBUTROL INJECTION: HCPCS | Performed by: PHYSICIAN ASSISTANT

## 2024-10-29 PROCEDURE — 76377 3D RENDER W/INTRP POSTPROCES: CPT

## 2024-10-29 RX ORDER — GADOBUTROL 604.72 MG/ML
13 INJECTION INTRAVENOUS
Status: COMPLETED | OUTPATIENT
Start: 2024-10-29 | End: 2024-10-29

## 2024-10-29 RX ADMIN — GADOBUTROL 13 ML: 604.72 INJECTION INTRAVENOUS at 10:57

## 2024-10-29 NOTE — TELEPHONE ENCOUNTER
Patient had PSA done 10/25/24 = 3.796    Previous PSA 9/10/24 = 3.060    Please review/advise    CB#[phone]

## 2024-10-29 NOTE — TELEPHONE ENCOUNTER
Patient called today to ask for a returned call.     Pt states he had PSA drawn and noticed that the levels increased. Pt is questioning if stress levels can affect the results?    Please review.    Call back [phone]

## 2024-11-01 ENCOUNTER — APPOINTMENT (OUTPATIENT)
Dept: LAB | Age: 59
End: 2024-11-01

## 2024-11-01 ENCOUNTER — TELEPHONE (OUTPATIENT)
Dept: UROLOGY | Facility: CLINIC | Age: 59
End: 2024-11-01

## 2024-11-01 DIAGNOSIS — Z00.6 ENCOUNTER FOR EXAMINATION FOR NORMAL COMPARISON OR CONTROL IN CLINICAL RESEARCH PROGRAM: ICD-10-CM

## 2024-11-01 PROCEDURE — 36415 COLL VENOUS BLD VENIPUNCTURE: CPT

## 2024-11-01 NOTE — TELEPHONE ENCOUNTER
Spoke with pt relaying Nemo CROWELL message,     He should establish care with urologist in [place]. They should continue close monitoring of PSA and should complete workup for blood in urine. He will need a cystoscopy down there.  Pt verbalized understanding

## 2024-11-01 NOTE — TELEPHONE ENCOUNTER
Spoke with pt relaying Nemo CROWELL message,   Please let the patient know the wonderful news that the MRI is negative for prostate cancer.   Pt verbalized understanding and stated he will be leaving to [place], should he continue to keep a close eye on it, and continue with the blood work going forward. Please advise    ----- Message from Nemo Hancock PA-C sent at 11/1/2024  1:03 PM EDT -----  Please let the patient know the wonderful news that the MRI is negative for prostate cancer.

## 2024-11-01 NOTE — TELEPHONE ENCOUNTER
He should establish care with urologist in [place]. They should continue close monitoring of PSA and should complete workup for blood in urine. He will need a cystoscopy down there.

## 2024-11-06 NOTE — TELEPHONE ENCOUNTER
Roxie called from urology office in [place] requesting PSA and MRI reports. They were both faxed through [employer] to [phone].

## 2024-11-13 LAB
APOB+LDLR+PCSK9 GENE MUT ANL BLD/T: NOT DETECTED
BRCA1+BRCA2 DEL+DUP + FULL MUT ANL BLD/T: NOT DETECTED
MLH1+MSH2+MSH6+PMS2 GN DEL+DUP+FUL M: NOT DETECTED

## (undated) DEVICE — STERILE SURGICAL LUBRICANT,  TUBE: Brand: SURGILUBE

## (undated) DEVICE — CATH URETERAL 5FR X 70 CM FLEX TIP POLYUR BARD

## (undated) DEVICE — SPECIMEN CONTAINER STERILE PEEL PACK

## (undated) DEVICE — PREMIUM DRY TRAY LF: Brand: MEDLINE INDUSTRIES, INC.

## (undated) DEVICE — BASKET SPECIMEN RETRIVAL 1.9FR 120CM

## (undated) DEVICE — CHLORHEXIDINE 4PCT 4 OZ

## (undated) DEVICE — GLOVE INDICATOR PI UNDERGLOVE SZ 7 BLUE

## (undated) DEVICE — GUIDEWIRE STRGHT TIP 0.035 IN  SOLO PLUS

## (undated) DEVICE — STERILE CYSTO PACK: Brand: CARDINAL HEALTH

## (undated) DEVICE — PACK TUR

## (undated) DEVICE — GLOVE SRG BIOGEL ECLIPSE 7.5

## (undated) DEVICE — INVIEW CLEAR LEGGINGS: Brand: CONVERTORS

## (undated) DEVICE — FIBER STD QUANTA 272 MICRON

## (undated) DEVICE — BAG URINE DRAINAGE 2000ML ANTI RFLX LF

## (undated) DEVICE — UROCATCH BAG

## (undated) DEVICE — GLOVE INDICATOR PI UNDERGLOVE SZ 8 BLUE

## (undated) DEVICE — Device

## (undated) DEVICE — 3M™ TEGADERM™ TRANSPARENT FILM DRESSING FRAME STYLE, 1624W, 2-3/8 IN X 2-3/4 IN (6 CM X 7 CM), 100/CT 4CT/CASE: Brand: 3M™ TEGADERM™